# Patient Record
Sex: FEMALE | Race: WHITE | Employment: PART TIME | URBAN - METROPOLITAN AREA
[De-identification: names, ages, dates, MRNs, and addresses within clinical notes are randomized per-mention and may not be internally consistent; named-entity substitution may affect disease eponyms.]

---

## 2017-01-04 ENCOUNTER — OFFICE VISIT (OUTPATIENT)
Dept: FAMILY MEDICINE CLINIC | Age: 47
End: 2017-01-04

## 2017-01-04 VITALS
DIASTOLIC BLOOD PRESSURE: 70 MMHG | HEIGHT: 72 IN | WEIGHT: 190 LBS | SYSTOLIC BLOOD PRESSURE: 122 MMHG | HEART RATE: 57 BPM | BODY MASS INDEX: 25.73 KG/M2 | RESPIRATION RATE: 16 BRPM

## 2017-01-04 DIAGNOSIS — E28.2 PCOS (POLYCYSTIC OVARIAN SYNDROME): Primary | ICD-10-CM

## 2017-01-04 PROCEDURE — 99214 OFFICE O/P EST MOD 30 MIN: CPT | Performed by: FAMILY MEDICINE

## 2017-06-14 ENCOUNTER — TELEPHONE (OUTPATIENT)
Dept: FAMILY MEDICINE CLINIC | Age: 47
End: 2017-06-14

## 2017-06-14 DIAGNOSIS — Z13.1 SCREENING FOR DIABETES MELLITUS: ICD-10-CM

## 2017-06-14 DIAGNOSIS — Z13.220 SCREENING, LIPID: Primary | ICD-10-CM

## 2017-06-28 LAB
CHOLESTEROL, TOTAL: 148 MG/DL (ref 0–199)
GLUCOSE BLD-MCNC: 96 MG/DL (ref 70–99)
HDLC SERPL-MCNC: 55 MG/DL (ref 40–60)
LDL CHOLESTEROL CALCULATED: 75 MG/DL
TRIGL SERPL-MCNC: 90 MG/DL (ref 0–150)

## 2017-07-05 ENCOUNTER — OFFICE VISIT (OUTPATIENT)
Dept: FAMILY MEDICINE CLINIC | Age: 47
End: 2017-07-05

## 2017-07-05 VITALS
SYSTOLIC BLOOD PRESSURE: 112 MMHG | RESPIRATION RATE: 20 BRPM | HEIGHT: 71 IN | TEMPERATURE: 98.3 F | BODY MASS INDEX: 27.16 KG/M2 | WEIGHT: 194 LBS | HEART RATE: 62 BPM | DIASTOLIC BLOOD PRESSURE: 72 MMHG

## 2017-07-05 DIAGNOSIS — E28.2 PCOS (POLYCYSTIC OVARIAN SYNDROME): Primary | ICD-10-CM

## 2017-07-05 PROCEDURE — 99213 OFFICE O/P EST LOW 20 MIN: CPT | Performed by: FAMILY MEDICINE

## 2017-07-05 ASSESSMENT — PATIENT HEALTH QUESTIONNAIRE - PHQ9
SUM OF ALL RESPONSES TO PHQ9 QUESTIONS 1 & 2: 0
2. FEELING DOWN, DEPRESSED OR HOPELESS: 0
1. LITTLE INTEREST OR PLEASURE IN DOING THINGS: 0
SUM OF ALL RESPONSES TO PHQ QUESTIONS 1-9: 0

## 2017-07-20 ENCOUNTER — HOSPITAL ENCOUNTER (OUTPATIENT)
Dept: WOMENS IMAGING | Age: 47
Discharge: HOME | End: 2017-07-20
Attending: FAMILY MEDICINE | Admitting: FAMILY MEDICINE

## 2017-07-20 DIAGNOSIS — Z12.31 ENCOUNTER FOR SCREENING MAMMOGRAM FOR HIGH-RISK PATIENT: ICD-10-CM

## 2017-09-06 ENCOUNTER — OFFICE VISIT (OUTPATIENT)
Dept: DERMATOLOGY | Age: 47
End: 2017-09-06

## 2017-09-06 DIAGNOSIS — Z86.018 HISTORY OF DYSPLASTIC NEVUS: ICD-10-CM

## 2017-09-06 DIAGNOSIS — L82.1 SEBORRHEIC KERATOSES: ICD-10-CM

## 2017-09-06 DIAGNOSIS — D22.9 BENIGN NEVUS: Primary | ICD-10-CM

## 2017-09-06 DIAGNOSIS — L82.0 SEBORRHEIC KERATOSES, INFLAMED: ICD-10-CM

## 2017-09-06 PROCEDURE — 99214 OFFICE O/P EST MOD 30 MIN: CPT | Performed by: DERMATOLOGY

## 2017-09-06 PROCEDURE — 17110 DESTRUCTION B9 LES UP TO 14: CPT | Performed by: DERMATOLOGY

## 2017-09-20 ENCOUNTER — OFFICE VISIT (OUTPATIENT)
Dept: FAMILY MEDICINE CLINIC | Age: 47
End: 2017-09-20

## 2017-09-20 VITALS
WEIGHT: 196 LBS | DIASTOLIC BLOOD PRESSURE: 64 MMHG | HEART RATE: 62 BPM | TEMPERATURE: 98.6 F | HEIGHT: 71 IN | SYSTOLIC BLOOD PRESSURE: 118 MMHG | RESPIRATION RATE: 20 BRPM | BODY MASS INDEX: 27.44 KG/M2

## 2017-09-20 DIAGNOSIS — R49.0 HOARSENESS: ICD-10-CM

## 2017-09-20 DIAGNOSIS — J06.9 VIRAL URI: Primary | ICD-10-CM

## 2017-09-20 PROCEDURE — 99213 OFFICE O/P EST LOW 20 MIN: CPT | Performed by: FAMILY MEDICINE

## 2017-11-27 ENCOUNTER — TELEPHONE (OUTPATIENT)
Dept: DERMATOLOGY | Age: 47
End: 2017-11-27

## 2017-11-27 NOTE — TELEPHONE ENCOUNTER
Pt calling states she has a mole on her stomach pls call patient back to discuss @ [phone] thank you

## 2017-12-04 ENCOUNTER — OFFICE VISIT (OUTPATIENT)
Dept: DERMATOLOGY | Age: 47
End: 2017-12-04

## 2017-12-04 DIAGNOSIS — Z86.018 HISTORY OF DYSPLASTIC NEVUS: ICD-10-CM

## 2017-12-04 DIAGNOSIS — D22.9 BENIGN NEVUS: ICD-10-CM

## 2017-12-04 DIAGNOSIS — D48.5 NEOPLASM OF UNCERTAIN BEHAVIOR OF SKIN: Primary | ICD-10-CM

## 2017-12-04 PROCEDURE — 99212 OFFICE O/P EST SF 10 MIN: CPT | Performed by: DERMATOLOGY

## 2017-12-04 PROCEDURE — 11100 PR BIOPSY OF SKIN LESION: CPT | Performed by: DERMATOLOGY

## 2017-12-04 NOTE — PROGRESS NOTES
[facility]) Dermatology  Chito Santana M.D.  [phone]       Jackie Loyola  1970    41 y.o. female     Date of Visit: 12/4/2017    Chief Complaint:   Chief Complaint   Patient presents with    Skin Lesion     abdomen and chest        I was asked to see this patient by Dr. Lopez ref. provider found. History of Present Illness:  1. Patient notes brown nevus left abdomen has increased in size relatively abruptly-over the last 1-2 months. Asymptomatic. Not itching, bleeding. Has a slightly moderate diffuse nevus right superior breast.  Seems to be stable in size-not itching, bleeding. .      Skin history:  + hats/ SPF. No prior h/o tanning bed     No PH skin cancer  1/16 right medial breast dysplastic nevus with mild dysplasia     + mother / father with NMSC    Review of Systems:  Constitutional: Reports general sense of well-being       Past Medical History, Surgical History, Family History, Medications and Allergies reviewed. 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 Social History Main Topics    Smoking status: Never Smoker    Smokeless tobacco: Never Used    Alcohol use No    Drug use: No    Sexual activity: Yes     Partners: Male      Comment: Gracy     Other Topics Concern    Not on file     Social History Narrative    No narrative on file       Physical Examination       -General: Well-appearing, NAD  1. Left abdomen 6 mm well-demarcated dark brown papule-rule out atypia. Right breast slightly more diffuse papule      Assessment and Plan     1. Neoplasm of uncertain behavior of skin -Left abdomen--Discussed possible diagnosis. Patient agreeable to biopsy. Verbal consent obtained after risks (infection, bleeding, scar), benefits and alternatives explained.    -Area(s) to be biopsied were marked with a surgical pen. Site(s) were cleansed with alcohol. Local anesthesia achieved with 1% lidocaine with epinephrine/sodium bicarbonate. Shave biopsy performed with a razor blade. Hemostasis was achieved with aluminum chloride. The wound(s) were dressed with petrolatum and covered with a bandage. Specimen(s) sent to pathology. Pt educated re: risk of bleeding, infection, scar and wound care instructions. 2. Benign nevus - Benign acquired melanocytic nevi  -Recommend monthly self skin exams   -Educated regarding the ABCDEs of melanoma detection   -Call for any new/changing moles or concerning lesions  -Reviewed sun protective behavior -- sun avoidance during the peak hours of the day, sun-protective clothing (including hat and sunglasses), sunscreen use (water resistant, broad spectrum, SPF at least 30, need for reapplication every 2 to 3 hours), avoidance of tanning beds      3.  History of dysplastic nevus -Patient aware of increased risk of malignancy, monitor her nevi for change

## 2017-12-11 ENCOUNTER — TELEPHONE (OUTPATIENT)
Dept: DERMATOLOGY | Age: 47
End: 2017-12-11

## 2017-12-11 NOTE — TELEPHONE ENCOUNTER
Called and LM on  with pathology results. (OK per HIPAA)      Date of biopsy: 12/04/2017  Site of biopsy: L abdomen  Result: Compound nevus    Plan: No further treatment needed.

## 2018-03-25 ENCOUNTER — NURSE TRIAGE (OUTPATIENT)
Dept: OTHER | Facility: CLINIC | Age: 48
End: 2018-03-25

## 2018-03-25 ENCOUNTER — TELEPHONE (OUTPATIENT)
Dept: FAMILY MEDICINE CLINIC | Age: 48
End: 2018-03-25

## 2018-03-25 NOTE — TELEPHONE ENCOUNTER
Reason for Disposition   Caller has already spoken with the PCP and has no further questions. Protocols used: NO CONTACT OR DUPLICATE CONTACT CALL-ADULT-    PCP called patient back and feels home care is fine at this time and that headache is probably due to new onset illness. Patient will wait, call PCP in AM for appt, but will call back if pain persists or worsens, or if unable to get in to see PCP tomorrow.

## 2018-03-27 ENCOUNTER — OFFICE VISIT (OUTPATIENT)
Dept: FAMILY MEDICINE CLINIC | Age: 48
End: 2018-03-27

## 2018-03-27 VITALS
DIASTOLIC BLOOD PRESSURE: 82 MMHG | HEART RATE: 63 BPM | OXYGEN SATURATION: 99 % | SYSTOLIC BLOOD PRESSURE: 110 MMHG | RESPIRATION RATE: 16 BRPM | HEIGHT: 71 IN | TEMPERATURE: 97.9 F | BODY MASS INDEX: 27.3 KG/M2 | WEIGHT: 195 LBS

## 2018-03-27 DIAGNOSIS — R51.9 CHRONIC INTRACTABLE HEADACHE, UNSPECIFIED HEADACHE TYPE: Primary | ICD-10-CM

## 2018-03-27 DIAGNOSIS — R11.0 NAUSEA: ICD-10-CM

## 2018-03-27 DIAGNOSIS — G89.29 CHRONIC INTRACTABLE HEADACHE, UNSPECIFIED HEADACHE TYPE: Primary | ICD-10-CM

## 2018-03-27 PROCEDURE — 99214 OFFICE O/P EST MOD 30 MIN: CPT | Performed by: FAMILY MEDICINE

## 2018-03-27 ASSESSMENT — ENCOUNTER SYMPTOMS
NAUSEA: 1
RESPIRATORY NEGATIVE: 1
EYES NEGATIVE: 1

## 2018-06-13 ENCOUNTER — OFFICE VISIT (OUTPATIENT)
Dept: FAMILY MEDICINE CLINIC | Age: 48
End: 2018-06-13

## 2018-06-13 VITALS
WEIGHT: 181 LBS | BODY MASS INDEX: 25.34 KG/M2 | SYSTOLIC BLOOD PRESSURE: 112 MMHG | HEIGHT: 71 IN | RESPIRATION RATE: 12 BRPM | DIASTOLIC BLOOD PRESSURE: 64 MMHG | HEART RATE: 71 BPM | OXYGEN SATURATION: 96 % | TEMPERATURE: 98.2 F

## 2018-06-13 DIAGNOSIS — Z13.1 SCREENING FOR DIABETES MELLITUS: ICD-10-CM

## 2018-06-13 DIAGNOSIS — M54.2 CERVICAL PAIN: ICD-10-CM

## 2018-06-13 DIAGNOSIS — F43.22 ANXIOUS MOOD AS ADJUSTMENT REACTION: ICD-10-CM

## 2018-06-13 DIAGNOSIS — Z13.220 SCREENING, LIPID: ICD-10-CM

## 2018-06-13 DIAGNOSIS — M54.41 ACUTE RIGHT-SIDED LOW BACK PAIN WITH RIGHT-SIDED SCIATICA: ICD-10-CM

## 2018-06-13 DIAGNOSIS — Z00.00 WELL ADULT EXAM: Primary | ICD-10-CM

## 2018-06-13 DIAGNOSIS — R20.2 PARESTHESIA OF RIGHT FOOT: ICD-10-CM

## 2018-06-13 LAB
A/G RATIO: 1.7 (ref 1.1–2.2)
ALBUMIN SERPL-MCNC: 4.5 G/DL (ref 3.4–5)
ALP BLD-CCNC: 44 U/L (ref 40–129)
ALT SERPL-CCNC: 11 U/L (ref 10–40)
ANION GAP SERPL CALCULATED.3IONS-SCNC: 15 MMOL/L (ref 3–16)
AST SERPL-CCNC: 15 U/L (ref 15–37)
BILIRUB SERPL-MCNC: 0.6 MG/DL (ref 0–1)
BUN BLDV-MCNC: 5 MG/DL (ref 7–20)
CALCIUM SERPL-MCNC: 10.2 MG/DL (ref 8.3–10.6)
CHLORIDE BLD-SCNC: 102 MMOL/L (ref 99–110)
CHOLESTEROL, TOTAL: 165 MG/DL (ref 0–199)
CO2: 24 MMOL/L (ref 21–32)
CREAT SERPL-MCNC: 0.7 MG/DL (ref 0.6–1.1)
GFR AFRICAN AMERICAN: >60
GFR NON-AFRICAN AMERICAN: >60
GLOBULIN: 2.6 G/DL
GLUCOSE BLD-MCNC: 99 MG/DL (ref 70–99)
HDLC SERPL-MCNC: 64 MG/DL (ref 40–60)
LDL CHOLESTEROL CALCULATED: 86 MG/DL
POTASSIUM SERPL-SCNC: 4.1 MMOL/L (ref 3.5–5.1)
SODIUM BLD-SCNC: 141 MMOL/L (ref 136–145)
TOTAL PROTEIN: 7.1 G/DL (ref 6.4–8.2)
TRIGL SERPL-MCNC: 75 MG/DL (ref 0–150)
VLDLC SERPL CALC-MCNC: 15 MG/DL

## 2018-06-13 PROCEDURE — 99396 PREV VISIT EST AGE 40-64: CPT | Performed by: FAMILY MEDICINE

## 2018-06-15 ENCOUNTER — TELEPHONE (OUTPATIENT)
Dept: FAMILY MEDICINE CLINIC | Age: 48
End: 2018-06-15

## 2018-06-19 ENCOUNTER — TELEPHONE (OUTPATIENT)
Dept: FAMILY MEDICINE CLINIC | Age: 48
End: 2018-06-19

## 2018-06-19 RX ORDER — HYDROXYZINE HYDROCHLORIDE 25 MG/1
25 TABLET, FILM COATED ORAL 3 TIMES DAILY PRN
Qty: 90 TABLET | Refills: 0 | Status: SHIPPED | OUTPATIENT
Start: 2018-06-19 | End: 2018-07-19

## 2018-07-06 ENCOUNTER — OFFICE VISIT (OUTPATIENT)
Dept: FAMILY MEDICINE CLINIC | Age: 48
End: 2018-07-06

## 2018-07-06 VITALS
TEMPERATURE: 98.4 F | BODY MASS INDEX: 24.08 KG/M2 | RESPIRATION RATE: 12 BRPM | WEIGHT: 172 LBS | SYSTOLIC BLOOD PRESSURE: 110 MMHG | HEIGHT: 71 IN | HEART RATE: 64 BPM | DIASTOLIC BLOOD PRESSURE: 82 MMHG

## 2018-07-06 DIAGNOSIS — F41.9 ANXIETY DISORDER, UNSPECIFIED TYPE: Primary | ICD-10-CM

## 2018-07-06 PROCEDURE — 99213 OFFICE O/P EST LOW 20 MIN: CPT | Performed by: FAMILY MEDICINE

## 2018-07-06 ASSESSMENT — PATIENT HEALTH QUESTIONNAIRE - PHQ9
SUM OF ALL RESPONSES TO PHQ9 QUESTIONS 1 & 2: 1
SUM OF ALL RESPONSES TO PHQ QUESTIONS 1-9: 1
1. LITTLE INTEREST OR PLEASURE IN DOING THINGS: 1
2. FEELING DOWN, DEPRESSED OR HOPELESS: 0

## 2018-07-09 ENCOUNTER — TELEPHONE (OUTPATIENT)
Dept: FAMILY MEDICINE CLINIC | Age: 48
End: 2018-07-09

## 2018-07-10 ENCOUNTER — TELEPHONE (OUTPATIENT)
Dept: FAMILY MEDICINE CLINIC | Age: 48
End: 2018-07-10

## 2018-07-10 DIAGNOSIS — G89.29 CHRONIC RIGHT-SIDED LOW BACK PAIN WITH RIGHT-SIDED SCIATICA: Primary | ICD-10-CM

## 2018-07-10 DIAGNOSIS — M54.41 CHRONIC RIGHT-SIDED LOW BACK PAIN WITH RIGHT-SIDED SCIATICA: Primary | ICD-10-CM

## 2018-07-10 NOTE — TELEPHONE ENCOUNTER
Pt called and wanted to know if dr Naif Nunes can order a mri of the lower lumbar and pelvic area. She is having more back pain and its going down right leg and getting numb.   Her therapist also suggested getting this done        Please advise

## 2018-07-16 ENCOUNTER — TELEPHONE (OUTPATIENT)
Dept: FAMILY MEDICINE CLINIC | Age: 48
End: 2018-07-16

## 2018-08-17 ENCOUNTER — OFFICE VISIT (OUTPATIENT)
Dept: FAMILY MEDICINE CLINIC | Age: 48
End: 2018-08-17

## 2018-08-17 VITALS
TEMPERATURE: 98.6 F | BODY MASS INDEX: 22.68 KG/M2 | OXYGEN SATURATION: 98 % | DIASTOLIC BLOOD PRESSURE: 82 MMHG | HEIGHT: 71 IN | RESPIRATION RATE: 12 BRPM | HEART RATE: 63 BPM | WEIGHT: 162 LBS | SYSTOLIC BLOOD PRESSURE: 110 MMHG

## 2018-08-17 DIAGNOSIS — F43.22 ANXIOUS MOOD AS ADJUSTMENT REACTION: Primary | ICD-10-CM

## 2018-08-17 DIAGNOSIS — H69.82 DYSFUNCTION OF LEFT EUSTACHIAN TUBE: ICD-10-CM

## 2018-08-17 PROCEDURE — 99214 OFFICE O/P EST MOD 30 MIN: CPT | Performed by: FAMILY MEDICINE

## 2018-08-17 NOTE — PROGRESS NOTES
Subjective:      Patient ID: Ernie Gallardo is a 57 y.o. female. Blood pressure 110/82, pulse 63, temperature 98.6 °F (37 °C), temperature source Oral, resp. rate 12, height 5' 11\" (1.803 m), weight 162 lb (73.5 kg), SpO2 98 %, not currently breastfeeding. HPI Here for routine follow up of anxiety. Is a mom of 4. Started zoloft 6 weeks ago- 25mg for 1 week, then 50mg since. In the past week she has started to note some positive effects:  Sleeping better  Less stressed and no longer has panic symptoms or the threat of panic. Muscle tension may be less. (but also seeing physical therapy for neck tightness)    Side effects to zoloft: At first was sweaty, ear congestion. Now resolved. Sees psychologistJonatan weekly, but she is going out of town for a while and will not be back until October. Is OK with not seeing her for a while. Left ear frequently feels 'clogged' and abnormal hearing. Has tried ear drops of some sort- does seem to help. Has been swimming a lot lately. Has not had fall allergies before. No other symptoms. Patient Active Problem List   Diagnosis    Labor and delivery indication for care or intervention    PCOS (polycystic ovarian syndrome)- based on oligomenorrhea, high androstenedione.  Anxious mood as adjustment reaction      Body mass index is 22.59 kg/m². Wt Readings from Last 3 Encounters:   08/17/18 162 lb (73.5 kg)   07/06/18 172 lb (78 kg)   06/13/18 181 lb (82.1 kg)      BP Readings from Last 3 Encounters:   08/17/18 110/82   07/06/18 110/82   06/13/18 112/64      Current Outpatient Prescriptions   Medication Sig Dispense Refill    sertraline (ZOLOFT) 50 MG tablet Take 1 tablet by mouth daily 30 tablet 3    prenatal vitamin (PRENATAL-S) 27-0.8 MG TABS Take 1 tablet by mouth daily.  fish oil-omega-3 fatty acids 1000 MG capsule Take 2 g by mouth daily. No current facility-administered medications for this visit.        Immunization

## 2018-09-20 ENCOUNTER — OFFICE VISIT (OUTPATIENT)
Dept: FAMILY MEDICINE CLINIC | Age: 48
End: 2018-09-20

## 2018-09-20 VITALS
DIASTOLIC BLOOD PRESSURE: 74 MMHG | HEART RATE: 62 BPM | SYSTOLIC BLOOD PRESSURE: 116 MMHG | WEIGHT: 167.6 LBS | HEIGHT: 71 IN | OXYGEN SATURATION: 98 % | BODY MASS INDEX: 23.46 KG/M2 | TEMPERATURE: 98.2 F | RESPIRATION RATE: 16 BRPM

## 2018-09-20 DIAGNOSIS — R20.2 PARESTHESIA OF UPPER LIMB: ICD-10-CM

## 2018-09-20 DIAGNOSIS — M54.16 LUMBAR RADICULOPATHY: ICD-10-CM

## 2018-09-20 DIAGNOSIS — M54.12 CERVICAL RADICULOPATHY: Primary | ICD-10-CM

## 2018-09-20 DIAGNOSIS — R20.2 LEG PARESTHESIA: ICD-10-CM

## 2018-09-20 DIAGNOSIS — M41.114 JUVENILE IDIOPATHIC SCOLIOSIS OF THORACIC REGION: ICD-10-CM

## 2018-09-20 PROCEDURE — 99213 OFFICE O/P EST LOW 20 MIN: CPT | Performed by: FAMILY MEDICINE

## 2018-09-20 NOTE — PROGRESS NOTES
Value Ref Range Status   06/13/2018 64 (H) 40 - 60 mg/dL Final   07/12/2011 54 40 - 60 mg/dl Final     Triglycerides   Date Value Ref Range Status   06/13/2018 75 0 - 150 mg/dL Final     Lab Results   Component Value Date    GLUCOSE 99 06/13/2018     Lab Results   Component Value Date     06/13/2018    K 4.1 06/13/2018    CREATININE 0.7 06/13/2018     Lab Results   Component Value Date    WBC 6.3 01/27/2016    HGB 13.0 01/27/2016    HCT 39.6 01/27/2016    MCV 88.0 01/27/2016     01/27/2016     Lab Results   Component Value Date    ALT 11 06/13/2018    AST 15 06/13/2018    ALKPHOS 44 06/13/2018    BILITOT 0.6 06/13/2018     TSH (uIU/ml)   Date Value   07/09/2012 1.46     No results found for: LABA1C  PROBLEM VISIT NOTE   Subjective:     Chief Complaint   Patient presents with    Other     pt c/o numbness and tingling in her arms and legs that has been going on off and on all Summer. Pt would like to discuss having an MRI of her back. Frutoso Braman is a 57 y.o. female who presents with  Duration tingle ache L leg last few weeks, and  left arm and R forearm last few days  In PT starting in July for low back pain and R foot tingling- back better but leg not  Had PT before that for neck and R arm tingling which did clear up in June  Denies incontinence  Hx scoliosis    Patient's medications, allergies, past medical, surgical, social and family histories were reviewed and updated as appropriate (see below). CHART REVIEW  Health Maintenance   Topic Date Due    HIV screen  08/02/1987    Cervical cancer screen  10/02/2015    Flu vaccine (1) 09/01/2018    Lipid screen  06/13/2023    DTaP/Tdap/Td vaccine (2 - Td) 09/22/2024      Patient Active Problem List   Diagnosis    Labor and delivery indication for care or intervention    PCOS (polycystic ovarian syndrome)- based on oligomenorrhea, high androstenedione.     Anxious mood as adjustment reaction    Juvenile idiopathic scoliosis of thoracic region     Cholesterol, Total   Date Value Ref Range Status   06/13/2018 165 0 - 199 mg/dL Final     LDL Calculated   Date Value Ref Range Status   06/13/2018 86 <100 mg/dL Final     HDL   Date Value Ref Range Status   06/13/2018 64 (H) 40 - 60 mg/dL Final   07/12/2011 54 40 - 60 mg/dl Final     Triglycerides   Date Value Ref Range Status   06/13/2018 75 0 - 150 mg/dL Final     Lab Results   Component Value Date    GLUCOSE 99 06/13/2018     Lab Results   Component Value Date     06/13/2018    K 4.1 06/13/2018    CREATININE 0.7 06/13/2018     Lab Results   Component Value Date    WBC 6.3 01/27/2016    HGB 13.0 01/27/2016    HCT 39.6 01/27/2016    MCV 88.0 01/27/2016     01/27/2016     Lab Results   Component Value Date    ALT 11 06/13/2018    AST 15 06/13/2018    ALKPHOS 44 06/13/2018    BILITOT 0.6 06/13/2018     TSH (uIU/ml)   Date Value   07/09/2012 1.46     No results found for: LABA1C  Current Outpatient Prescriptions   Medication Sig Dispense Refill    sertraline (ZOLOFT) 50 MG tablet Take 1 tablet by mouth daily 30 tablet 3    prenatal vitamin (PRENATAL-S) 27-0.8 MG TABS Take 1 tablet by mouth daily.  fish oil-omega-3 fatty acids 1000 MG capsule Take 2 g by mouth daily. No current facility-administered medications for this visit. Objective:    PHYSICAL EXAM   /74 (Site: Left Upper Arm, Position: Sitting, Cuff Size: Medium Adult)   Pulse 62   Temp 98.2 °F (36.8 °C) (Oral)   Resp 16   Ht 5' 11\" (1.803 m)   Wt 167 lb 9.6 oz (76 kg)   SpO2 98%   BMI 23.38 kg/m²   Blood pressure is Good. BP Readings from Last 5 Encounters:   09/20/18 116/74   08/17/18 110/82   07/06/18 110/82   06/13/18 112/64   03/27/18 110/82     Weight is increased.    Wt Readings from Last 5 Encounters:   09/20/18 167 lb 9.6 oz (76 kg)   08/17/18 162 lb (73.5 kg)   07/06/18 172 lb (78 kg)   06/13/18 181 lb (82.1 kg)   03/27/18 195 lb (88.5 kg)      GENERAL:   · well-developed, well-nourished,

## 2018-09-24 ENCOUNTER — TELEPHONE (OUTPATIENT)
Dept: FAMILY MEDICINE CLINIC | Age: 48
End: 2018-09-24

## 2018-09-25 ENCOUNTER — TELEPHONE (OUTPATIENT)
Dept: FAMILY MEDICINE CLINIC | Age: 48
End: 2018-09-25

## 2018-09-25 DIAGNOSIS — R20.2 PARESTHESIA OF UPPER AND LOWER EXTREMITIES OF BOTH SIDES: Primary | ICD-10-CM

## 2018-09-25 NOTE — TELEPHONE ENCOUNTER
Pt has an MRI schedule for this Friday and would like to speak with Dr. Bautista Lock again regarding this. Pt states she spoke with him today already.        Call back # [phone]

## 2018-09-26 ENCOUNTER — TELEPHONE (OUTPATIENT)
Dept: FAMILY MEDICINE CLINIC | Age: 48
End: 2018-09-26

## 2018-09-26 NOTE — TELEPHONE ENCOUNTER
Pt called back today stating perhaps she should speak with Dr. Jay Oglesby in regards to the MRI bc she ordered it. Pt states she feels the MRI may be better with contrast since it was ordered to rule out MS.        Pt's call back number: [phone]

## 2018-09-26 NOTE — TELEPHONE ENCOUNTER
Talked to [facility]. I am not sure that her constellation of symptoms necessarily warrant $7000 of mri testing just yet. She is mainly concerned with UE paresthesias. I suggested seeing a neurologist first and getting their opinion prior to having further testing. EMG may be a better test at this point. But have not evaluated her problem directly. Staff: please give Daja the referral info for dr Stefani Mayers. Thanks.

## 2018-09-28 ENCOUNTER — HOSPITAL ENCOUNTER (OUTPATIENT)
Dept: MRI IMAGING | Age: 48
Discharge: HOME | End: 2018-09-28
Payer: COMMERCIAL

## 2018-09-28 DIAGNOSIS — M54.12 CERVICAL RADICULOPATHY: ICD-10-CM

## 2018-09-28 DIAGNOSIS — R20.2 LEG PARESTHESIA: ICD-10-CM

## 2018-09-28 DIAGNOSIS — R20.2 PARESTHESIA OF UPPER LIMB: ICD-10-CM

## 2018-09-28 DIAGNOSIS — M54.16 LUMBAR RADICULOPATHY: ICD-10-CM

## 2018-09-28 PROCEDURE — 70551 MRI BRAIN STEM W/O DYE: CPT

## 2018-09-28 PROCEDURE — 72141 MRI NECK SPINE W/O DYE: CPT

## 2018-10-01 ENCOUNTER — OFFICE VISIT (OUTPATIENT)
Dept: FAMILY MEDICINE CLINIC | Age: 48
End: 2018-10-01
Payer: COMMERCIAL

## 2018-10-01 VITALS
HEART RATE: 71 BPM | TEMPERATURE: 97.6 F | RESPIRATION RATE: 14 BRPM | DIASTOLIC BLOOD PRESSURE: 76 MMHG | HEIGHT: 71 IN | WEIGHT: 164 LBS | SYSTOLIC BLOOD PRESSURE: 126 MMHG | BODY MASS INDEX: 22.96 KG/M2

## 2018-10-01 DIAGNOSIS — M47.812 FACET ARTHRITIS, DEGENERATIVE, CERVICAL SPINE: ICD-10-CM

## 2018-10-01 DIAGNOSIS — M79.602 PAIN IN BOTH UPPER EXTREMITIES: Primary | ICD-10-CM

## 2018-10-01 DIAGNOSIS — M79.601 PAIN IN BOTH UPPER EXTREMITIES: Primary | ICD-10-CM

## 2018-10-01 DIAGNOSIS — F43.22 ANXIOUS MOOD AS ADJUSTMENT REACTION: ICD-10-CM

## 2018-10-01 PROCEDURE — 99213 OFFICE O/P EST LOW 20 MIN: CPT | Performed by: FAMILY MEDICINE

## 2018-11-08 ENCOUNTER — OFFICE VISIT (OUTPATIENT)
Dept: FAMILY MEDICINE CLINIC | Age: 48
End: 2018-11-08
Payer: COMMERCIAL

## 2018-11-08 VITALS
WEIGHT: 169.2 LBS | HEIGHT: 71 IN | DIASTOLIC BLOOD PRESSURE: 70 MMHG | BODY MASS INDEX: 23.69 KG/M2 | SYSTOLIC BLOOD PRESSURE: 110 MMHG

## 2018-11-08 DIAGNOSIS — M47.812 FACET ARTHRITIS, DEGENERATIVE, CERVICAL SPINE: ICD-10-CM

## 2018-11-08 DIAGNOSIS — Z00.00 HEALTHCARE MAINTENANCE: Primary | ICD-10-CM

## 2018-11-08 PROCEDURE — 99203 OFFICE O/P NEW LOW 30 MIN: CPT | Performed by: FAMILY MEDICINE

## 2018-11-08 NOTE — PROGRESS NOTES
respiratory distress. Abdominal: Soft. Bowel sounds are normal. There is no tenderness. Musculoskeletal: She exhibits no edema. Lymphadenopathy:     She has no cervical adenopathy. Neurological: She is alert and oriented to person, place, and time. Skin: Skin is warm. No rash noted. Psychiatric: She has a normal mood and affect. Thought content normal.       Assessment:      Assessment/Plan:  Daja was seen today for established new doctor.     Diagnoses and all orders for this visit:    Healthcare maintenance    Facet arthritis, degenerative, cervical spine            Plan:      Health care discussion   recommend lab [facility]          [address],

## 2018-11-12 PROBLEM — F43.22 ANXIOUS MOOD AS ADJUSTMENT REACTION: Status: RESOLVED | Noted: 2018-06-13 | Resolved: 2018-11-12

## 2018-11-12 ASSESSMENT — ENCOUNTER SYMPTOMS
COUGH: 0
ABDOMINAL PAIN: 0
WHEEZING: 0
EYE PAIN: 0
SHORTNESS OF BREATH: 0

## 2018-11-12 ASSESSMENT — PATIENT HEALTH QUESTIONNAIRE - PHQ9
SUM OF ALL RESPONSES TO PHQ QUESTIONS 1-9: 0
SUM OF ALL RESPONSES TO PHQ9 QUESTIONS 1 & 2: 0
2. FEELING DOWN, DEPRESSED OR HOPELESS: 0
1. LITTLE INTEREST OR PLEASURE IN DOING THINGS: 0
SUM OF ALL RESPONSES TO PHQ QUESTIONS 1-9: 0

## 2018-12-11 ENCOUNTER — OFFICE VISIT (OUTPATIENT)
Dept: FAMILY MEDICINE CLINIC | Age: 48
End: 2018-12-11
Payer: COMMERCIAL

## 2018-12-11 VITALS
DIASTOLIC BLOOD PRESSURE: 80 MMHG | WEIGHT: 163.2 LBS | BODY MASS INDEX: 22.85 KG/M2 | SYSTOLIC BLOOD PRESSURE: 120 MMHG | HEIGHT: 71 IN

## 2018-12-11 DIAGNOSIS — M79.2 NEUROPATHIC PAIN: Primary | ICD-10-CM

## 2018-12-11 PROCEDURE — 99214 OFFICE O/P EST MOD 30 MIN: CPT | Performed by: FAMILY MEDICINE

## 2018-12-11 RX ORDER — GABAPENTIN 100 MG/1
100 CAPSULE ORAL 3 TIMES DAILY
Qty: 90 CAPSULE | Refills: 1 | Status: SHIPPED | OUTPATIENT
Start: 2018-12-11 | End: 2018-12-28 | Stop reason: DRUGHIGH

## 2018-12-12 ASSESSMENT — ENCOUNTER SYMPTOMS
SHORTNESS OF BREATH: 0
COUGH: 0
BACK PAIN: 1
CHEST TIGHTNESS: 0
ABDOMINAL PAIN: 0

## 2018-12-28 ENCOUNTER — OFFICE VISIT (OUTPATIENT)
Dept: FAMILY MEDICINE CLINIC | Age: 48
End: 2018-12-28
Payer: COMMERCIAL

## 2018-12-28 VITALS
DIASTOLIC BLOOD PRESSURE: 60 MMHG | BODY MASS INDEX: 22.57 KG/M2 | HEIGHT: 71 IN | SYSTOLIC BLOOD PRESSURE: 110 MMHG | WEIGHT: 161.2 LBS

## 2018-12-28 DIAGNOSIS — G62.9 NEUROPATHY: ICD-10-CM

## 2018-12-28 DIAGNOSIS — M47.812 FACET ARTHRITIS, DEGENERATIVE, CERVICAL SPINE: Primary | ICD-10-CM

## 2018-12-28 PROCEDURE — 99213 OFFICE O/P EST LOW 20 MIN: CPT | Performed by: FAMILY MEDICINE

## 2018-12-28 RX ORDER — GABAPENTIN 300 MG/1
300 CAPSULE ORAL 2 TIMES DAILY
Qty: 60 CAPSULE | Refills: 0 | Status: SHIPPED | OUTPATIENT
Start: 2018-12-28 | End: 2019-02-20

## 2018-12-29 ASSESSMENT — ENCOUNTER SYMPTOMS
WHEEZING: 0
EYE PAIN: 0
SHORTNESS OF BREATH: 0
COUGH: 0
ABDOMINAL PAIN: 0

## 2019-02-20 ENCOUNTER — OFFICE VISIT (OUTPATIENT)
Dept: DERMATOLOGY | Age: 49
End: 2019-02-20
Payer: COMMERCIAL

## 2019-02-20 DIAGNOSIS — B07.8 OTHER VIRAL WARTS: ICD-10-CM

## 2019-02-20 DIAGNOSIS — Z86.018 HISTORY OF DYSPLASTIC NEVUS: ICD-10-CM

## 2019-02-20 DIAGNOSIS — D22.9 BENIGN NEVUS: ICD-10-CM

## 2019-02-20 DIAGNOSIS — L82.1 SEBORRHEIC KERATOSES: ICD-10-CM

## 2019-02-20 DIAGNOSIS — D48.5 NEOPLASM OF UNCERTAIN BEHAVIOR OF SKIN: Primary | ICD-10-CM

## 2019-02-20 PROCEDURE — 17110 DESTRUCTION B9 LES UP TO 14: CPT | Performed by: DERMATOLOGY

## 2019-02-20 PROCEDURE — 99214 OFFICE O/P EST MOD 30 MIN: CPT | Performed by: DERMATOLOGY

## 2019-02-20 PROCEDURE — 11102 TANGNTL BX SKIN SINGLE LES: CPT | Performed by: DERMATOLOGY

## 2019-02-22 LAB — DERMATOLOGY PATHOLOGY REPORT: NORMAL

## 2019-03-28 ENCOUNTER — OFFICE VISIT (OUTPATIENT)
Dept: FAMILY MEDICINE CLINIC | Age: 49
End: 2019-03-28
Payer: COMMERCIAL

## 2019-03-28 VITALS
HEIGHT: 71 IN | WEIGHT: 154.13 LBS | HEART RATE: 82 BPM | SYSTOLIC BLOOD PRESSURE: 110 MMHG | OXYGEN SATURATION: 99 % | DIASTOLIC BLOOD PRESSURE: 70 MMHG | BODY MASS INDEX: 21.58 KG/M2

## 2019-03-28 DIAGNOSIS — R25.3 MUSCLE TWITCHING: ICD-10-CM

## 2019-03-28 DIAGNOSIS — Z12.39 SCREENING FOR BREAST CANCER: Primary | ICD-10-CM

## 2019-03-28 DIAGNOSIS — R07.9 CHEST PAIN, UNSPECIFIED TYPE: ICD-10-CM

## 2019-03-28 LAB — RHEUMATOID FACTOR: <10 IU/ML

## 2019-03-28 PROCEDURE — 93000 ELECTROCARDIOGRAM COMPLETE: CPT | Performed by: INTERNAL MEDICINE

## 2019-03-28 PROCEDURE — 99204 OFFICE O/P NEW MOD 45 MIN: CPT | Performed by: INTERNAL MEDICINE

## 2019-03-28 RX ORDER — MAGNESIUM OXIDE 400 MG/1
400 TABLET ORAL DAILY
COMMUNITY
End: 2019-04-16

## 2019-03-28 ASSESSMENT — PATIENT HEALTH QUESTIONNAIRE - PHQ9
2. FEELING DOWN, DEPRESSED OR HOPELESS: 0
SUM OF ALL RESPONSES TO PHQ QUESTIONS 1-9: 0
1. LITTLE INTEREST OR PLEASURE IN DOING THINGS: 0
SUM OF ALL RESPONSES TO PHQ9 QUESTIONS 1 & 2: 0
SUM OF ALL RESPONSES TO PHQ QUESTIONS 1-9: 0

## 2019-03-29 LAB
ANA INTERPRETATION: ABNORMAL
ANA TITER: ABNORMAL
ANTI-CENTROMERE B IGG: <0.2 AI (ref 0–0.9)
ANTI-CHROMATIN IGG: 0.9 AI (ref 0–0.9)
ANTI-DSDNA IGG: 112 IU/ML (ref 0–9)
ANTI-JO1 IGG: <0.2 AI (ref 0–0.9)
ANTI-NUCLEAR ANTIBODY (ANA): POSITIVE
ANTI-RIBOSOMAL P IGG: <0.2 AI (ref 0–0.9)
ANTI-RNP IGG: 0.2 AI (ref 0–0.9)
ANTI-SCL70 IGG: <0.2 AI (ref 0–0.9)
ANTI-SMITH IGG: <0.2 AI (ref 0–0.9)
ANTI-SMRNP IGG: <0.2 AI (ref 0–0.9)
ANTI-SS-A IGG: <0.2 AI (ref 0–0.9)
ANTI-SS-B IGG: <0.2 AI (ref 0–0.9)

## 2019-04-01 ENCOUNTER — TELEPHONE (OUTPATIENT)
Dept: FAMILY MEDICINE CLINIC | Age: 49
End: 2019-04-01

## 2019-04-01 DIAGNOSIS — R76.8 DS DNA ANTIBODY POSITIVE: Primary | ICD-10-CM

## 2019-04-03 ENCOUNTER — EMPLOYEE WELLNESS (OUTPATIENT)
Dept: OTHER | Age: 49
End: 2019-04-03

## 2019-04-03 LAB
CHOLESTEROL, TOTAL: 143 MG/DL (ref 0–199)
GLUCOSE BLD-MCNC: 97 MG/DL (ref 70–99)
HDLC SERPL-MCNC: 64 MG/DL (ref 40–60)
LDL CHOLESTEROL CALCULATED: 70 MG/DL
TRIGL SERPL-MCNC: 47 MG/DL (ref 0–150)

## 2019-04-08 ENCOUNTER — TELEPHONE (OUTPATIENT)
Dept: FAMILY MEDICINE CLINIC | Age: 49
End: 2019-04-08

## 2019-04-08 VITALS — WEIGHT: 147 LBS | BODY MASS INDEX: 20.5 KG/M2

## 2019-04-08 DIAGNOSIS — R76.8 DS DNA ANTIBODY POSITIVE: Primary | ICD-10-CM

## 2019-04-08 NOTE — TELEPHONE ENCOUNTER
The Pt wanted to let Ammon know that she was interested in seeing Ruben Esposito for her Rheumatology appt. He is with [employer]. PT is wanting to know if the referral could be made and faxed to Dr. Pricilla Abreu. PT would like a callback when completed.      NICKOLAS---[phone]  Phone for Dr Sha Edwards ---[phone]

## 2019-04-08 NOTE — TELEPHONE ENCOUNTER
PT would like a call back regarding her rheumatology and neurologist  appt         Best call back---[phone]

## 2019-04-10 ENCOUNTER — TELEPHONE (OUTPATIENT)
Dept: FAMILY MEDICINE CLINIC | Age: 49
End: 2019-04-10

## 2019-04-10 ENCOUNTER — TELEPHONE (OUTPATIENT)
Dept: INTERNAL MEDICINE CLINIC | Age: 49
End: 2019-04-10

## 2019-04-10 NOTE — TELEPHONE ENCOUNTER
Pt call and states that she had scheduled with Dr Jensen Davis as a New Pt. She is newly diagnosed with lupus, she is having Headache since 8 days, she want to ask Dr headache can cause by lupus? ? She request call back.

## 2019-04-10 NOTE — TELEPHONE ENCOUNTER
Dr Nicholas Rodríguez advises headache can be caused by Lupus. I called pt and advised. Her appointment is not until July with Dr William Khanna. She has appointment with PCP tomorrow. Advised pt to go to er if the headaches become too severe. I told her I would leave message for Dr William Khanna to see when she returns to office of Wednesday.

## 2019-04-11 ENCOUNTER — OFFICE VISIT (OUTPATIENT)
Dept: FAMILY MEDICINE CLINIC | Age: 49
End: 2019-04-11
Payer: COMMERCIAL

## 2019-04-11 ENCOUNTER — TELEPHONE (OUTPATIENT)
Dept: FAMILY MEDICINE CLINIC | Age: 49
End: 2019-04-11

## 2019-04-11 VITALS
OXYGEN SATURATION: 98 % | BODY MASS INDEX: 20.74 KG/M2 | HEIGHT: 71 IN | WEIGHT: 148.13 LBS | HEART RATE: 68 BPM | SYSTOLIC BLOOD PRESSURE: 118 MMHG | DIASTOLIC BLOOD PRESSURE: 76 MMHG

## 2019-04-11 DIAGNOSIS — G44.52 NEW DAILY PERSISTENT HEADACHE: Primary | ICD-10-CM

## 2019-04-11 DIAGNOSIS — R76.8 POSITIVE DOUBLE STRANDED DNA ANTIBODY TEST: ICD-10-CM

## 2019-04-11 DIAGNOSIS — R76.8 POSITIVE DOUBLE STRANDED DNA ANTIBODY TEST: Primary | ICD-10-CM

## 2019-04-11 LAB
A/G RATIO: 1.8 (ref 1.1–2.2)
ALBUMIN SERPL-MCNC: 4.4 G/DL (ref 3.4–5)
ALP BLD-CCNC: 44 U/L (ref 40–129)
ALT SERPL-CCNC: 23 U/L (ref 10–40)
ANION GAP SERPL CALCULATED.3IONS-SCNC: 13 MMOL/L (ref 3–16)
AST SERPL-CCNC: 21 U/L (ref 15–37)
BASOPHILS ABSOLUTE: 0 K/UL (ref 0–0.2)
BASOPHILS RELATIVE PERCENT: 0.5 %
BILIRUB SERPL-MCNC: 0.3 MG/DL (ref 0–1)
BUN BLDV-MCNC: 5 MG/DL (ref 7–20)
C-REACTIVE PROTEIN: <0.3 MG/L (ref 0–5.1)
CALCIUM SERPL-MCNC: 9.5 MG/DL (ref 8.3–10.6)
CHLORIDE BLD-SCNC: 101 MMOL/L (ref 99–110)
CO2: 25 MMOL/L (ref 21–32)
CREAT SERPL-MCNC: 0.6 MG/DL (ref 0.6–1.1)
EOSINOPHILS ABSOLUTE: 0.2 K/UL (ref 0–0.6)
EOSINOPHILS RELATIVE PERCENT: 3 %
GFR AFRICAN AMERICAN: >60
GFR NON-AFRICAN AMERICAN: >60
GLOBULIN: 2.4 G/DL
GLUCOSE BLD-MCNC: 86 MG/DL (ref 70–99)
HCT VFR BLD CALC: 36.1 % (ref 36–48)
HEMOGLOBIN: 12.1 G/DL (ref 12–16)
LYMPHOCYTES ABSOLUTE: 1.3 K/UL (ref 1–5.1)
LYMPHOCYTES RELATIVE PERCENT: 22.3 %
MCH RBC QN AUTO: 29.1 PG (ref 26–34)
MCHC RBC AUTO-ENTMCNC: 33.4 G/DL (ref 31–36)
MCV RBC AUTO: 87.1 FL (ref 80–100)
MONOCYTES ABSOLUTE: 0.4 K/UL (ref 0–1.3)
MONOCYTES RELATIVE PERCENT: 6.1 %
NEUTROPHILS ABSOLUTE: 4 K/UL (ref 1.7–7.7)
NEUTROPHILS RELATIVE PERCENT: 68.1 %
PDW BLD-RTO: 16.6 % (ref 12.4–15.4)
PLATELET # BLD: 234 K/UL (ref 135–450)
PMV BLD AUTO: 9 FL (ref 5–10.5)
POTASSIUM SERPL-SCNC: 4.4 MMOL/L (ref 3.5–5.1)
RBC # BLD: 4.15 M/UL (ref 4–5.2)
SEDIMENTATION RATE, ERYTHROCYTE: 10 MM/HR (ref 0–20)
SODIUM BLD-SCNC: 139 MMOL/L (ref 136–145)
TOTAL PROTEIN: 6.8 G/DL (ref 6.4–8.2)
TSH REFLEX: 0.96 UIU/ML (ref 0.27–4.2)
WBC # BLD: 5.8 K/UL (ref 4–11)

## 2019-04-11 PROCEDURE — 36415 COLL VENOUS BLD VENIPUNCTURE: CPT | Performed by: INTERNAL MEDICINE

## 2019-04-11 PROCEDURE — 99214 OFFICE O/P EST MOD 30 MIN: CPT | Performed by: INTERNAL MEDICINE

## 2019-04-11 ASSESSMENT — ENCOUNTER SYMPTOMS
SORE THROAT: 0
DIARRHEA: 0
SINUS PRESSURE: 0
RHINORRHEA: 0
SHORTNESS OF BREATH: 0
ABDOMINAL PAIN: 0
SINUS PAIN: 0
VOMITING: 0
EYE REDNESS: 0
EYE DISCHARGE: 0
NAUSEA: 0
WHEEZING: 0
COUGH: 0

## 2019-04-11 NOTE — PROGRESS NOTES
2019     Daja Murillo (:  1970) is a 57 y.o. female, here for evaluation of the following medical concerns:    HPI  Patient presents for evaluation of headaches. She reports this is her eighth day of persistent headaches. She noticed onset of headaches when she was fasting for [place]. She's cut back on this to once a week. She denies any neurologic symptoms including vision change, however, she did have some brief tingling in one of her fingers yesterday. She continues to have issues with muscle fasciculations. She is undergoing a workup for possible lupus or neurodegenerative disease. She rates the headache as bad as a 7 out of 10, however, she reports she finally took ibuprofen for the first time yesterday and her headache is down to a 4 out of 10 this morning. She reports this is the first day that she is starting to feel some relief. She's had a little bit of nausea without vomiting. She has had some sound sensitivity. No photophobia. She has a prior history of migraines. She otherwise feels well and has no systemic symptoms. Review of Systems   Constitutional: Negative for chills and fever. HENT: Negative for congestion, ear pain, rhinorrhea, sinus pressure, sinus pain and sore throat. Eyes: Negative for discharge and redness. Respiratory: Negative for cough, shortness of breath and wheezing. Cardiovascular: Negative for chest pain, palpitations and leg swelling. Gastrointestinal: Negative for abdominal pain, diarrhea, nausea and vomiting. Musculoskeletal: Negative for myalgias. Skin: Negative for rash. Neurological: Positive for headaches. Negative for dizziness, seizures, facial asymmetry, speech difficulty, weakness, light-headedness and numbness. Intermittent issues with numbness/tingling         Prior to Visit Medications    Medication Sig Taking?  Authorizing Provider   magnesium oxide (MAG-OX) 400 MG tablet Take 400 mg by mouth daily Yes Historical Provider, MD labs including inflammatory markers to assess for possible vasculitis. She has no signs of temporal arteritis. She is making an appointment with the rheumatologist to evaluate her positive double-stranded DNA antibodies. Her neurologic exam is normal today. Her headaches do seem to be abating at this time and recommend she continue to use ibuprofen 600-800 mg every 8 hours as needed. Strict precautions were reviewed regarding when to seek medical attention for new or worsening symptoms including neurologic deficit, intractable headache. She voiced agreement and understanding of plan. We will schedule follow-up for next week to follow this to symptom resolution.  - TSH with Reflex  - CBC WITH AUTO DIFFERENTIAL; Future  - COMPREHENSIVE METABOLIC PANEL; Future  - C-REACTIVE PROTEIN; Future  - SEDIMENTATION RATE; Future    2. Positive ds-dna ab  - referral as above. May be contributing to current symptomology. Return in about 4 days (around 4/15/2019). An electronic signature was used to authenticate this note.     --Iker Sow MD on 4/11/2019 at 11:34 AM

## 2019-04-11 NOTE — TELEPHONE ENCOUNTER
PT stopped in at  after her appointment with the name of the rheumatologist to send referral to. Dr. Josephina Meckel. Fax number: [phone].

## 2019-04-11 NOTE — TELEPHONE ENCOUNTER
Called pt. She saw her pcp this am. She advised her to take ibuprofen for her symptoms. She states that she should go to the ER if she has neurological symptoms. Told her to get in with Rheumatology earlier. Her pcp feels this is lupus related and feels she should get in sooner. I spoke with pt in detail about options of getting her in sooner with Rheumatology. We went ahead and scheduled her with  for sooner appt. She is scheduled for 4/24/2019 with  for NPV. Fyi sent to both physicians.

## 2019-04-11 NOTE — TELEPHONE ENCOUNTER
Please find out how patient is doing today. Hopefully she is feeling better from her headache. I hope she got a chance to see her PCP and if needed she may need a referral to see a neurologist given by her PCP for this issue. I look forward to seeing her soon.

## 2019-04-15 ENCOUNTER — TELEPHONE (OUTPATIENT)
Dept: FAMILY MEDICINE CLINIC | Age: 49
End: 2019-04-15

## 2019-04-15 NOTE — TELEPHONE ENCOUNTER
The PT would like to know if  would be able to call her regarding her recent labs. PT knows she supposed to be seen in the office but is unable to due to work schedule.        Best call back---[phone]

## 2019-04-15 NOTE — TELEPHONE ENCOUNTER
I think an attempt to call her was made by [facility]. Her results were normal. Are her headaches improving

## 2019-04-16 ENCOUNTER — OFFICE VISIT (OUTPATIENT)
Dept: RHEUMATOLOGY | Age: 49
End: 2019-04-16
Payer: COMMERCIAL

## 2019-04-16 VITALS
DIASTOLIC BLOOD PRESSURE: 70 MMHG | HEIGHT: 71 IN | SYSTOLIC BLOOD PRESSURE: 103 MMHG | BODY MASS INDEX: 20.86 KG/M2 | WEIGHT: 149 LBS | HEART RATE: 63 BPM

## 2019-04-16 DIAGNOSIS — R76.8 POSITIVE DOUBLE STRANDED DNA ANTIBODY TEST: Primary | ICD-10-CM

## 2019-04-16 DIAGNOSIS — R76.8 POSITIVE DOUBLE STRANDED DNA ANTIBODY TEST: ICD-10-CM

## 2019-04-16 DIAGNOSIS — R25.3 MUSCLE TWITCHING: ICD-10-CM

## 2019-04-16 LAB
C3 COMPLEMENT: 76.7 MG/DL (ref 90–180)
C4 COMPLEMENT: 9.9 MG/DL (ref 10–40)
RHEUMATOID FACTOR: 12 IU/ML

## 2019-04-16 PROCEDURE — 99244 OFF/OP CNSLTJ NEW/EST MOD 40: CPT | Performed by: INTERNAL MEDICINE

## 2019-04-16 NOTE — PROGRESS NOTES
2019  Patient Name: Marcio  : 1970  Medical Record: [id number]    MEDICATIONS  Current Outpatient Medications   Medication Sig Dispense Refill    Multiple Vitamin (MULTI VITAMIN DAILY PO) Take by mouth      fish oil-omega-3 fatty acids 1000 MG capsule Take 2 g by mouth daily. No current facility-administered medications for this visit. ALLERGIES  No Known Allergies      Comments  No specialty comments available. REFERRING PHYSICIAN: Deedee Tran MD    HISTORY OF PRESENT ILLNESS  Marcio is a 57 y.o. female who is being seen for follow up evaluation of  positive double-stranded DNA. One year ago she was seeing a chiropractor and had neck and right arm injury. She saw another chiropractor and had low back injury. She developed severe anxiety after first injury. She started Zoloft and developed muscle twitching. She discontinued Zoloft but her muscle twitches continued. After the injury she felt widespread neuropathic pain and got concerned that she has multiple sclerosis. She went on autoimmune diet [PALEO DIET], pain improved but her muscle twitches continued. She went to see a neurologist, she had extensive neurological workup which was unremarkable. She also had brain MRI which was unremarkable and she had cervical spine MRI which showed mild degenerative changes. She was advised to follow-up if muscle twitches worsen. She denies muscle weakness, diplopia, dysarthria, facial droop. She was fasting for a lENT, she developed a rash on her legs along with swollen knees and wrists which got better in 5 days. She then developed a migraine headache which has been persistent for past 9 days. She never had any migraine headaches before. She went to see her primary care physician who wanted to do her blood work to rule out any underlying connective tissue disease which came back positive with low titer positive KIM 1:40, positive dsDNA by Tati.   SSA/SSB, anti-Burt, RNP, anticentromere, anti-chromatin, anti-Vandana 1, SCL 70 negative. She denies malar rash, photosensitivity, oral or nasal ulcers, pleurisy or pericarditis, alopecia, history of miscarriages or pregnancy complications or blood clots, raynaud's, joint symptoms, skin tightening, sclerodactyly, renal diseases, memory loss, seizures. She has occasional dry mouth. She denies fever, persistent night sweats. She has lost 45 pounds on KETO DIET. Her mother has factor V Leiden and sister has positive anticardiolipin. HPI  Review of Systems    REVIEW OF SYSTEMS:   Constitutional: No fevers. No fatigue and malaise. Integumentary: No rash, photosensitivity, malar rash, livedo reticularis, alopecia and Raynaud's symptoms, sclerodactyly, skin tightening  Eyes: negative for visual disturbance and persistent redness, discharge from eyes   ENT: - No tinnitus, loss of hearing, vertigo, or recurrent ear infections.  - No history of nasal/oral ulcers. - No history of dry eyes  Cardiovascular: No history of pericarditis, chest pain or murmur or palpitations  Respiratory: No shortness of breath, cough or history of interstitial lung disease. No history of pleurisy. No history of tuberculosis or atypical infections. Gastrointestinal: No history of heart burn, dysphagia or esophageal dysmotility. No change in bowel habits or any inflammatory bowel disease. Genitourinary: No history renal disease, miscarriages. Hematologic/Lymphatic: No abnormal bruising or bleeding, blood clots or swollen lymph nodes. Musculoskeletal: Denies having any swollen joints, joint pains or stiffness   Neurological: No history of seizure or focal weakness. No history of neuropathies, paresthesias or hyperesthesias, facial droop, diplopia  Psychiatric: No history of bipolar disease, depression  Endocrine: Denies any polyuria, polydipsia and osteoporosis  Allergic/Immunologic: No nasal congestion or hives.         I have reviewed patients Past medical History, Social History and Family History as mentioned in her chart and this remains unchanged fromprevious. 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    Social Needs    Financial resource strain: Not on file    Food insecurity:     Worry: Not on file     Inability: Not on file    Transportation needs:     Medical: Not on file     Non-medical: Not on file   Tobacco Use    Smoking status: Never Smoker    Smokeless tobacco: Never Used   Substance and Sexual Activity    Alcohol use: Yes     Comment: social    Drug use: No    Sexual activity: Yes     Partners: Male     Comment: Khalif   Lifestyle    Physical activity:     Days per week: Not on file     Minutes per session: Not on file    Stress: Not on file   Relationships    Social connections:     Talks on phone: Not on file     Gets together: Not on file     Attends [employer] service: Not on file     Active member of club or organization: Not on file     Attends meetings of clubs or organizations: Not on file     Relationship status: Not on file    Intimate partner violence:     Fear of current or ex partner: Not on file     Emotionally abused: Not on file     Physically abused: Not on file     Forced sexual activity: Not on file   Other Topics Concern    Not on file   Social History Narrative    Not on file     Family History   Problem Relation Age of Onset    Cancer Mother         skin benign    Other Mother         factor 5 +/bronciectisis    Heart Disease Father    Mario Augusto Cancer Father         skin benign    Other Sister         cardiolipin +         PHYSICAL EXAM   Vitals:    04/16/19 1256   BP: 131/85   Site: Right Lower Arm   Pulse: 65   Weight: 149 lb (67.6 kg)   Height: 5' 11\" (1.803 m)     Physical Exam  Constitutional:  Well developed, well nourished, no acute distress, non-toxic appearance   Musculoskeletal:    Ambulates without assistance, normal gait  Neck: Full ROM, no tenderness,supple   Upper Extremities        Shoulder: Full ROM, no crepitus        Elbow:  Full ROM, no synovitis, no deformity        Wrist: Full ROM, no synovitis, no deformity, no ulnar deviation        Fingers: No sclerodactly, no active raynaud's, no ulcers. MCPs: No synovitis, no deformity             PIPs:  No synovitis, no deformity             DIPs: No synovitis, no deformity             Nails: No pitting, no telangiectasias. Lower Extremities        Hip: Full ROM, no tenderness to palpation        Knee: Full ROM, no erythema/swelling/laxity/crepitus. Patellanot ballotable. Ankle: Full ROM, no swelling or erythema        MTPs: No swelling or erythema  Back- no tenderness. Eyes:  PERRL, extra ocular movements intact, conjunctiva normal   HEENT:  Atraumatic, normocephalic, external ears normal, oropharynx moist, no pharyngeal exudates. Respiratory:  No respiratory distress  GI:  Soft, nondistended, normal bowel sounds, nontender, noorganomegaly, no mass, no rebound, no guarding   :  No costovertebral angle tenderness   Integument:  Well hydrated, no rash or telangiectasias  Lymphatic:  No lymphadenopathy noted   Neurologic:   Alert & oriented x 3, CN 2-12 normal, no focal deficits noted. Sensations Intact. Muscle strength 5/5 proximallyand distally in upper and lower extremities.    Psychiatric:  Speech and behavior appropriate           LABS AND IMAGING  Outside data reviewed and in HPI    Lab Results   Component Value Date    WBC 5.8 04/11/2019    RBC 4.15 04/11/2019    HGB 12.1 04/11/2019    HCT 36.1 04/11/2019     04/11/2019    MCV 87.1 04/11/2019    MCH 29.1 04/11/2019    MCHC 33.4 04/11/2019    RDW 16.6 04/11/2019    SEGSPCT 77.2 08/19/2012    BANDSPCT 2.5 07/09/2012    METASPCT 0.5 07/09/2012    LYMPHOPCT 22.3 04/11/2019    MONOPCT 6.1 04/11/2019    MYELOPCT 1.0 07/09/2012    EOSPCT 2.5 07/12/2011    BASOPCT 0.5 04/11/2019    MONOSABS 0.4 04/11/2019    LYMPHSABS 1.3 04/11/2019    EOSABS 0.2 04/11/2019    BASOSABS 0.0 04/11/2019    DIFFTYPE Auto 08/19/2012       Chemistry        Component Value Date/Time     04/11/2019 1055    K 4.4 04/11/2019 1055     04/11/2019 1055    CO2 25 04/11/2019 1055    BUN 5 (L) 04/11/2019 1055    CREATININE 0.6 04/11/2019 1055        Component Value Date/Time    CALCIUM 9.5 04/11/2019 1055    ALKPHOS 44 04/11/2019 1055    AST 21 04/11/2019 1055    ALT 23 04/11/2019 1055    BILITOT 0.3 04/11/2019 1055          Lab Results   Component Value Date    SEDRATE 10 04/11/2019     Lab Results   Component Value Date    CRP <0.3 04/11/2019     Lab Results   Component Value Date    KIM POSITIVE 03/28/2019     Lab Results   Component Value Date    RF <10.0 03/28/2019     Lab Results   Component Value Date    KIM POSITIVE 03/28/2019    ANATITER <1:40 03/28/2019    ANAINT see below 03/28/2019     No results found for: DSDNAG, DSDNAIGGIFA  No results found for: SSAROAB, SSALAAB  No results found for: SMAB, RNPAB  No results found for: CENTABIGG  No results found for: C3, C4, ACE  No results found for: JO1, VITD25, [id number]  No results found for: Gorman Favre  Lab Results   Component Value Date    [id number] 707 01/24/2019     Lab Results   Component Value Date    TSHFT4 0.91 12/14/2016    TSH 1.58 01/24/2019     No results found for: VITD25    ASSESSMENT AND PLAN      Assessment/Plan:      ASSESSMENT:    1. Positive double stranded DNA antibody test    2. Muscle twitching        PLAN:     Bob Mcdermott was seen today for new patient.     Diagnoses and all orders for this visit:    Positive double stranded DNA antibody test-  History and physical examination not indicated of lupus or systemic rheumatic disease  -Low titer positive KIM <1:40  -Positive dsDNA by BHASKAR  -SSA/SSB, SCL 70, anticentromere, RNP, anti-Smith, anti-chromatin, anti-Vandana 1 antibodies negative  -I doubt she has lupus or systemic rheumatic disease positive dsDNA by BHASKAR which is most likely false positive  -I will repeat dsDNA by Crithidia method  -I will check other serologies to completely do workup  -Further management pending test results. -     Anti-DNA Antibody, Double-Stranded; Future  -     Miscellaneous Sendout 1; Future  -     C3 Complement; Future  -     C4 Complement; Future  -     Beta-2 Glycoprotein Antibodies; Future  -     Cardiolipin Antibodies IgG & IgM; Future  -     Cardiolipin Antibody, IgA; Future  -     Lupus Anticoagulant; Future  -     Cyclic Citrul Peptide Antibody, IgG; Future  -     Rheumatoid Factor; Future    Muscle twitching-extensive neurological workup in the past has been unremarkable. I would recommend following up with neurology to discuss further. Unlikely to be due to lupus or connective tissue disease         The patient indicates understanding of these issues and agrees with the plan. Return in about 3 weeks (around 5/7/2019). The risks and benefits of my recommendations, as well as other treatment options, benefits and side effects werediscussed with the patient. All questions were answered. I reviewed patient's history, referral documents and electronic medical records  Copy of consult note is being routedelectronically/faxed to referring physician         ######################################################################    I thank you for giving me theopportunity to participate in [facility]. If you have any questions or concerns please feel free to contact me.  I look forward to following  Daja along with you.      Electronically signed by: Timo Eubanks MD, 4/16/2019 1:43 PM    Documentation was done using voice recognition dragon software. Every effort was made to ensure accuracy;however, inadvertent unintentional computerized transcription errors may be present.

## 2019-04-17 LAB — ANTI-DSDNA IGG: 38 IU/ML (ref 0–9)

## 2019-04-18 LAB
ANTICARDIOLIPIN IGA ANTIBODY: 0 APL (ref 0–11)
ANTICARDIOLIPIN IGG ANTIBODY: 9 GPL (ref 0–14)
BETA-2 GLYCOPROTEIN 1 IGG ANTIBODY: 0 SGU (ref 0–20)
BETA-2 GLYCOPROTEIN 1 IGM ANTIBODY: 6 SMU (ref 0–20)
CARDIOLIPIN AB IGM: 16 MPL (ref 0–12)
DSDNA ANTIBODY TITER: NORMAL

## 2019-04-19 LAB
CCP IGG ANTIBODIES: 52 UNITS (ref 0–19)
DRVVT CONFIRMATION TEST: ABNORMAL RATIO
DRVVT SCREEN: 26 SEC (ref 33–44)
DRVVT,DIL: ABNORMAL SEC (ref 33–44)
DSDNA ANTIBODY TITER: NORMAL
HEXAGONAL PHOSPHOLIPID NEUTRALIZAT TEST: ABNORMAL
LUPUS ANTICOAG INTERP: ABNORMAL
PLT NEUTA: ABNORMAL
PT D: 13.3 SEC (ref 12–15.5)
PTT D: 38 SEC (ref 32–48)
PTT-D CORR REFLEX: ABNORMAL SEC (ref 32–48)
PTT-HEPARIN NEUTRALIZED: ABNORMAL SEC (ref 32–48)
REPTILASE TIME: ABNORMAL SEC
THROMBIN TIME: ABNORMAL SEC (ref 14.7–19.5)

## 2019-04-22 ENCOUNTER — TELEPHONE (OUTPATIENT)
Dept: RHEUMATOLOGY | Age: 49
End: 2019-04-22

## 2019-04-22 DIAGNOSIS — R79.89 LOW COMPLEMENT MEASUREMENT: Primary | ICD-10-CM

## 2019-04-22 NOTE — TELEPHONE ENCOUNTER
Notes recorded by Denise Epstein on 4/23/2019 at 1:39 PM EDT  Informed Pt of Dr Shiloh Glez notes and instructions    ------    Notes recorded by Denise Epstein on 4/22/2019 at 2:03 PM EDT  TCB  ------    Notes recorded by Randa Sands MD on 4/22/2019 at 12:48 PM EDT  Please call the patient and let her know that she has slightly low complements, positive anti-CCP antibody which is seen in patients with rheumatoid arthritis. Pushpa Contreras repeat double-stranded DNA came back negative. Eloy Rodas will repeat her complements to make sure these are not false positive tests.  I will also do urine evaluation to make sure there is no protein in the urine.

## 2019-05-03 DIAGNOSIS — R79.89 LOW COMPLEMENT MEASUREMENT: ICD-10-CM

## 2019-05-03 LAB
BILIRUBIN URINE: NEGATIVE
BLOOD, URINE: NEGATIVE
C3 COMPLEMENT: 79.4 MG/DL (ref 90–180)
C4 COMPLEMENT: 10.9 MG/DL (ref 10–40)
CLARITY: CLEAR
COLOR: YELLOW
CREATININE URINE: 15.1 MG/DL (ref 28–259)
GLUCOSE URINE: NEGATIVE MG/DL
KETONES, URINE: NEGATIVE MG/DL
LEUKOCYTE ESTERASE, URINE: NEGATIVE
MICROSCOPIC EXAMINATION: NORMAL
NITRITE, URINE: NEGATIVE
PH UA: 6.5 (ref 5–8)
PROTEIN PROTEIN: <4 MG/DL
PROTEIN UA: NEGATIVE MG/DL
SPECIFIC GRAVITY UA: 1 (ref 1–1.03)
URINE REFLEX TO CULTURE: NORMAL
URINE TYPE: NORMAL
UROBILINOGEN, URINE: 0.2 E.U./DL

## 2019-05-06 LAB — COMPLEMENT TOTAL (CH50): 44 CAE UNITS (ref 60–144)

## 2019-05-08 ENCOUNTER — TELEPHONE (OUTPATIENT)
Dept: FAMILY MEDICINE CLINIC | Age: 49
End: 2019-05-08

## 2019-05-08 NOTE — PROGRESS NOTES
2019  Patient Name: Marcio  : 1970  Medical Record: [id number]    MEDICATIONS  Current Outpatient Medications   Medication Sig Dispense Refill    Multiple Vitamin (MULTI VITAMIN DAILY PO) Take by mouth      fish oil-omega-3 fatty acids 1000 MG capsule Take 2 g by mouth daily. No current facility-administered medications for this visit. ALLERGIES  No Known Allergies      Comments  No specialty comments available. Background History:  Marcio is a 57 y.o. female who is being seen for follow up evaluation of  positive double-stranded DNA. One year ago she was seeing a chiropractor and had neck and right arm injury. She saw another chiropractor and had low back injury. She developed severe anxiety after first injury. She started Zoloft and developed muscle twitching. She discontinued Zoloft but her muscle twitches continued. After the injury she felt widespread neuropathic pain and got concerned that she has multiple sclerosis. She went on autoimmune diet [PALEO DIET], pain improved but her muscle twitches continued. She went to see a neurologist, she had extensive neurological workup which was unremarkable. She also had brain MRI which was unremarkable and she had cervical spine MRI which showed mild degenerative changes. She was advised to follow-up if muscle twitches worsen. She denies muscle weakness, diplopia, dysarthria, facial droop. She was fasting for a lENT, she developed a rash on her legs along with swollen knees and wrists which got better in 5 days. She then developed a migraine headache which has been persistent for past 9 days. She never had any migraine headaches before. She went to see her primary care physician who wanted to do her blood work to rule out any underlying connective tissue disease which came back positive with low titer positive KIM 1:40, positive dsDNA by Tati.   SSA/SSB, anti-Smith, RNP, anticentromere, anti-chromatin, change in bowel habits or any inflammatory bowel disease. Genitourinary: No history renal disease, miscarriages. Hematologic/Lymphatic: No abnormal bruising or bleeding, blood clots or swollen lymph nodes. Musculoskeletal: Denies having any swollen joints, joint pains or stiffness   Neurological: No history of seizure or focal weakness. No history of neuropathies, paresthesias or hyperesthesias, facial droop, diplopia  Psychiatric: No history of bipolar disease, depression  Endocrine: Denies any polyuria, polydipsia and osteoporosis  Allergic/Immunologic: No nasal congestion or hives. I have reviewed patients Past medical History, Social History and Family History as mentioned in her chart and this remains unchanged fromprevious. 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    Social Needs    Financial resource strain: Not on file    Food insecurity:     Worry: Not on file     Inability: Not on file    Transportation needs:     Medical: Not on file     Non-medical: Not on file   Tobacco Use    Smoking status: Never Smoker    Smokeless tobacco: Never Used   Substance and Sexual Activity    Alcohol use: Yes     Comment: social    Drug use: No    Sexual activity: Yes     Partners: Male     Comment: Khalif   Lifestyle    Physical activity:     Days per week: Not on file     Minutes per session: Not on file    Stress: Not on file   Relationships    Social connections:     Talks on phone: Not on file     Gets together: Not on file Attends [employer] service: Not on file     Active member of club or organization: Not on file     Attends meetings of clubs or organizations: Not on file     Relationship status: Not on file    Intimate partner violence:     Fear of current or ex partner: Not on file     Emotionally abused: Not on file     Physically abused: Not on file     Forced sexual activity: Not on file   Other Topics Concern    Not on file   Social History Narrative    Not on file     Family History   Problem Relation Age of Onset    Cancer Mother         skin benign    Other Mother         factor 5 +/bronciectisis    Heart Disease Father     Cancer Father         skin benign    Other Sister         cardiolipin +         PHYSICAL EXAM   Vitals:    05/09/19 0906   BP: 118/76   Site: Right Lower Arm   Pulse: 66   Weight: 151 lb (68.5 kg)   Height: 5' 11\" (1.803 m)     Physical Exam  Constitutional:  Well developed, well nourished, no acute distress, non-toxic appearance   Musculoskeletal:    Ambulates without assistance, normal gait  Neck: Full ROM, no tenderness,supple   Upper Extremities        Shoulder: Full ROM, no crepitus        Elbow:  Full ROM, no synovitis, no deformity        Wrist: Full ROM, no synovitis, no deformity, no ulnar deviation        Fingers: No sclerodactly, no active raynaud's, no ulcers. MCPs: No synovitis, no deformity             PIPs:  No synovitis, no deformity             DIPs: No synovitis, no deformity             Nails: No pitting, no telangiectasias. Lower Extremities        Hip: Full ROM, no tenderness to palpation        Knee: Full ROM, no erythema/swelling/laxity/crepitus. Patellanot ballotable. Ankle: Full ROM, no swelling or erythema        MTPs: No swelling or erythema  Back- no tenderness. Eyes:  PERRL, extra ocular movements intact, conjunctiva normal   HEENT:  Atraumatic, normocephalic, external ears normal, oropharynx moist, no pharyngeal exudates.    Respiratory:  No respiratory distress  GI:  Soft, nondistended, normal bowel sounds, nontender, noorganomegaly, no mass, no rebound, no guarding   :  No costovertebral angle tenderness   Integument:  Well hydrated, no rash or telangiectasias  Lymphatic:  No lymphadenopathy noted   Neurologic:   Alert & oriented x 3, CN 2-12 normal, no focal deficits noted. Sensations Intact. Muscle strength 5/5 proximallyand distally in upper and lower extremities.    Psychiatric:  Speech and behavior appropriate           LABS AND IMAGING  Outside data reviewed and in HPI    Lab Results   Component Value Date    WBC 5.8 04/11/2019    RBC 4.15 04/11/2019    HGB 12.1 04/11/2019    HCT 36.1 04/11/2019     04/11/2019    MCV 87.1 04/11/2019    MCH 29.1 04/11/2019    MCHC 33.4 04/11/2019    RDW 16.6 04/11/2019    SEGSPCT 77.2 08/19/2012    BANDSPCT 2.5 07/09/2012    METASPCT 0.5 07/09/2012    LYMPHOPCT 22.3 04/11/2019    MONOPCT 6.1 04/11/2019    MYELOPCT 1.0 07/09/2012    EOSPCT 2.5 07/12/2011    BASOPCT 0.5 04/11/2019    MONOSABS 0.4 04/11/2019    LYMPHSABS 1.3 04/11/2019    EOSABS 0.2 04/11/2019    BASOSABS 0.0 04/11/2019    DIFFTYPE Auto 08/19/2012       Chemistry        Component Value Date/Time     04/11/2019 1055    K 4.4 04/11/2019 1055     04/11/2019 1055    CO2 25 04/11/2019 1055    BUN 5 (L) 04/11/2019 1055    CREATININE 0.6 04/11/2019 1055        Component Value Date/Time    CALCIUM 9.5 04/11/2019 1055    ALKPHOS 44 04/11/2019 1055    AST 21 04/11/2019 1055    ALT 23 04/11/2019 1055    BILITOT 0.3 04/11/2019 1055          Lab Results   Component Value Date    SEDRATE 10 04/11/2019     Lab Results   Component Value Date    CRP <0.3 04/11/2019     Lab Results   Component Value Date    KIM POSITIVE 03/28/2019    C3 79.4 05/03/2019    C3 76.7 04/16/2019    C4 10.9 05/03/2019    C4 9.9 04/16/2019     Lab Results   Component Value Date    RF 12.0 04/16/2019    CCPABIGG 52 04/16/2019     Lab Results   Component Value Date    KIM POSITIVE 03/28/2019    ANATITER <1:40 03/28/2019    ANAINT see below 03/28/2019     Lab Results   Component Value Date    DSDNAIGGIFA <1:10 04/16/2019    [address] [facility] <1:10 04/16/2019     No results found for: Scot Berman  No results found for: SMAB, RNPAB  No results found for: CENTABIGG  Lab Results   Component Value Date    C3 79.4 05/03/2019    C4 10.9 05/03/2019     No results found for: JO1, VITD25, [id number]  No results found for: Maura Min Results   Component Value Date    [id number] 707 01/24/2019     Lab Results   Component Value Date    TSHFT4 0.91 12/14/2016    TSH 1.58 01/24/2019     No results found for: VITD25    ASSESSMENT AND PLAN      Assessment/Plan:      ASSESSMENT:    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 Positive double stranded DNA antibody test    3. Muscle twitching    4. 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 Doubt immune complex mediated. No evidence of lupus on history and physical examination.  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  -We will check other complements  -Will refer to pulmonology for evaluation  - C1Q BINDING ASSAY; Future  - C1 ESTERASE INHIBITOR TOTAL; Future  - C2 COMPLEMENT; Future  - Cryoglobulin; Future  - IgG, IgA, IgM; Future  - AFL - Francia Gibson MD, Allergy & Immunology, [facility]    2. Positive double stranded DNA antibody test  Low titer positive KIM less than 1:40  DsDNA positive by BHASKAR and negative by CITHIDRIA, low titer positive anticardiolipin IgG 16. Beta-2 glycoprotein, lupus antibody negative. SSA/SSB, SCL 70, anticentromere, RNP, anti-Smith, anti-chromatin, anti-Vandana 1 antibodies negative. No other signs and symptoms of lupus or systemic rheumatic disease. We will continue to monitor periodically  -  3. Muscle twitching  extensive neurological workup in the past has been unremarkable. I would recommend following up with neurology to discuss further. Unlikely to be due to lupus or connective tissue disease      4. Cyclic citrullinated peptide (CCP) antibody positive  RF negative, positive anti-CCP antibody. No joint symptoms, no synovitis on joint exam.  We will continue to monitor periodically. The patient indicates understanding of these issues and agrees with the plan. Return in about 6 months (around 11/9/2019). The risks and benefits of my recommendations, as well as other treatment options, benefits and side effects werediscussed with the patient. All questions were answered. ######################################################################    I thank you for giving me theopportunity to participate in [address] [place] [address]. If you have any questions or concerns please feel free to contact me. I look forward to following  Daja along with you. Electronically signed by: Nino Amezcua MD, 5/9/2019 12:42 PM    Documentation was done using voice recognition dragon software. Every effort was made to ensure accuracy;however, inadvertent unintentional computerized transcription errors may be present.

## 2019-05-08 NOTE — TELEPHONE ENCOUNTER
Is extremely likely to be viral give contagious nature through family; abx almost certainly not needed in - she should consider trying Visine OTC for relief of symptoms

## 2019-05-09 ENCOUNTER — OFFICE VISIT (OUTPATIENT)
Dept: RHEUMATOLOGY | Age: 49
End: 2019-05-09
Payer: COMMERCIAL

## 2019-05-09 VITALS
HEIGHT: 71 IN | SYSTOLIC BLOOD PRESSURE: 118 MMHG | HEART RATE: 66 BPM | WEIGHT: 151 LBS | BODY MASS INDEX: 21.14 KG/M2 | DIASTOLIC BLOOD PRESSURE: 76 MMHG

## 2019-05-09 DIAGNOSIS — R76.8 POSITIVE DOUBLE STRANDED DNA ANTIBODY TEST: ICD-10-CM

## 2019-05-09 DIAGNOSIS — D84.1 COMPLEMENT ABNORMALITY (HCC): Primary | ICD-10-CM

## 2019-05-09 DIAGNOSIS — R76.8 CYCLIC CITRULLINATED PEPTIDE (CCP) ANTIBODY POSITIVE: ICD-10-CM

## 2019-05-09 DIAGNOSIS — R25.3 MUSCLE TWITCHING: ICD-10-CM

## 2019-05-09 PROCEDURE — 99214 OFFICE O/P EST MOD 30 MIN: CPT | Performed by: INTERNAL MEDICINE

## 2019-05-29 ENCOUNTER — TELEPHONE (OUTPATIENT)
Dept: FAMILY MEDICINE CLINIC | Age: 49
End: 2019-05-29

## 2019-05-29 NOTE — TELEPHONE ENCOUNTER
The PT says that she is feeling tired, achy and Nauseous for about a month. PT wanted to discuss her recent labs and try to determine what is causing this. PT would like to be added to tomorrows schedule if possible. Best call back--- [phone]      PT says if she's able to be seen but doesn't answer when called just leave a message with the time and she'll show up. PT said she can show up anytime between 9-12 but would be willing to work with whatever is available.

## 2019-05-30 ENCOUNTER — OFFICE VISIT (OUTPATIENT)
Dept: FAMILY MEDICINE CLINIC | Age: 49
End: 2019-05-30
Payer: COMMERCIAL

## 2019-05-30 VITALS
BODY MASS INDEX: 20.9 KG/M2 | WEIGHT: 149.25 LBS | HEART RATE: 56 BPM | OXYGEN SATURATION: 99 % | SYSTOLIC BLOOD PRESSURE: 110 MMHG | DIASTOLIC BLOOD PRESSURE: 74 MMHG | HEIGHT: 71 IN

## 2019-05-30 DIAGNOSIS — R51.9 PERSISTENT HEADACHES: Primary | ICD-10-CM

## 2019-05-30 PROCEDURE — 99213 OFFICE O/P EST LOW 20 MIN: CPT | Performed by: INTERNAL MEDICINE

## 2019-05-30 RX ORDER — MAGNESIUM OXIDE 400 MG/1
400 TABLET ORAL DAILY
COMMUNITY
End: 2019-07-23 | Stop reason: ALTCHOICE

## 2019-05-30 ASSESSMENT — ENCOUNTER SYMPTOMS
VOICE CHANGE: 0
NAUSEA: 1
PHOTOPHOBIA: 0
ABDOMINAL PAIN: 0
TROUBLE SWALLOWING: 0
VOMITING: 0
COUGH: 0
DIARRHEA: 0
SHORTNESS OF BREATH: 0
SORE THROAT: 0

## 2019-05-30 NOTE — PROGRESS NOTES
2019     Daja Murillo (:  1970) is a 57 y.o. female, here for evaluation of the following medical concerns:    HPI  Patient presents for evaluation of ongoing recent illness. We worked this up previously with lab work that revealed a positive anti-double-stranded DNA. She also had a positive anti-CCP antibody. She was referred to rheumatology who plans to monitor this periodically as she did not have any symptoms of lupus or rheumatoid arthritis at that time. She was found to have low complement levels and has been referred to immunology. Her muscle twitching is not thought to be rheumatological in etiology and she has been referred back to neurology. She did see the neurologist again and he told her it may be related to a ketogenic diet. She did go back to a regular diet the last 1.5 weeks and has noted improvement in her muscle twitching. She reports she has been feeling fatigued, achy, and nauseated over the last month. She also has persistent headaches. She reports it causes her to feel achy. She feels excessively fatigued. She has been noting these to be as severe as a 7/10. She has noted this almost every day. She is taking iburpofen for the HA with some relief. No vision change, numbness/tingling, weakness. The headaches have improved a bit with her dietary change. She is adding back carbohydrates. She has no h/o migraine HA. She did have MRI of her head/neck in November that were normal.     Review of Systems   Constitutional: Positive for fatigue. Negative for chills, fever and unexpected weight change. HENT: Negative for hearing loss, sore throat, trouble swallowing and voice change. Eyes: Negative for photophobia and visual disturbance. Respiratory: Negative for cough and shortness of breath. Cardiovascular: Negative for chest pain. Gastrointestinal: Positive for nausea. Negative for abdominal pain, diarrhea and vomiting.    Endocrine: Negative for cold intolerance, heat regular rhythm and normal heart sounds. No murmur heard. Pulmonary/Chest: Effort normal and breath sounds normal. No respiratory distress. She has no wheezes. She has no rales. Abdominal: Soft. Bowel sounds are normal. There is no tenderness. Musculoskeletal: She exhibits no tenderness. Lymphadenopathy:     She has no cervical adenopathy. Neurological: No cranial nerve deficit. Skin: Skin is warm and dry. Capillary refill takes less than 2 seconds. No rash noted. ASSESSMENT/PLAN:  1. Persistent headaches  . Recommend she eat at minimum, 50 g of carbohydrates per day. Headaches are improving with increasing carbohydrate intake and coming off a ketogenic diet, however, they're not resolved completely. Patient will notify me if headaches persist beyond the next 2 weeks at which time I would recommend a MRI. She will plan to follow up with rheumatology as planned. Return in about 2 weeks (around 6/13/2019). An electronic signature was used to authenticate this note.     --Servando Britt MD on 5/30/2019 at 11:06 AM

## 2019-06-13 ENCOUNTER — TELEPHONE (OUTPATIENT)
Dept: FAMILY MEDICINE CLINIC | Age: 49
End: 2019-06-13

## 2019-06-13 ENCOUNTER — OFFICE VISIT (OUTPATIENT)
Dept: FAMILY MEDICINE CLINIC | Age: 49
End: 2019-06-13
Payer: COMMERCIAL

## 2019-06-13 VITALS
SYSTOLIC BLOOD PRESSURE: 116 MMHG | OXYGEN SATURATION: 99 % | HEART RATE: 78 BPM | WEIGHT: 148 LBS | DIASTOLIC BLOOD PRESSURE: 78 MMHG | BODY MASS INDEX: 20.72 KG/M2 | HEIGHT: 71 IN

## 2019-06-13 DIAGNOSIS — R11.0 NAUSEA: Primary | ICD-10-CM

## 2019-06-13 DIAGNOSIS — G89.29 CHRONIC BILATERAL THORACIC BACK PAIN: ICD-10-CM

## 2019-06-13 DIAGNOSIS — R25.3 MUSCLE TWITCHING: ICD-10-CM

## 2019-06-13 DIAGNOSIS — M54.6 CHRONIC BILATERAL THORACIC BACK PAIN: ICD-10-CM

## 2019-06-13 LAB
A/G RATIO: 1.9 (ref 1.1–2.2)
ALBUMIN SERPL-MCNC: 4.6 G/DL (ref 3.4–5)
ALP BLD-CCNC: 37 U/L (ref 40–129)
ALT SERPL-CCNC: 19 U/L (ref 10–40)
ANION GAP SERPL CALCULATED.3IONS-SCNC: 13 MMOL/L (ref 3–16)
AST SERPL-CCNC: 19 U/L (ref 15–37)
BILIRUB SERPL-MCNC: 0.5 MG/DL (ref 0–1)
BUN BLDV-MCNC: 7 MG/DL (ref 7–20)
CALCIUM SERPL-MCNC: 9.8 MG/DL (ref 8.3–10.6)
CHLORIDE BLD-SCNC: 101 MMOL/L (ref 99–110)
CO2: 23 MMOL/L (ref 21–32)
CREAT SERPL-MCNC: 0.7 MG/DL (ref 0.6–1.1)
GFR AFRICAN AMERICAN: >60
GFR NON-AFRICAN AMERICAN: >60
GLOBULIN: 2.4 G/DL
GLUCOSE BLD-MCNC: 102 MG/DL (ref 70–99)
POTASSIUM SERPL-SCNC: 4.3 MMOL/L (ref 3.5–5.1)
SODIUM BLD-SCNC: 137 MMOL/L (ref 136–145)
TOTAL CK: 58 U/L (ref 26–192)
TOTAL PROTEIN: 7 G/DL (ref 6.4–8.2)

## 2019-06-13 PROCEDURE — 99214 OFFICE O/P EST MOD 30 MIN: CPT | Performed by: INTERNAL MEDICINE

## 2019-06-13 PROCEDURE — 36415 COLL VENOUS BLD VENIPUNCTURE: CPT | Performed by: INTERNAL MEDICINE

## 2019-06-13 RX ORDER — OMEPRAZOLE 40 MG/1
40 CAPSULE, DELAYED RELEASE ORAL
Qty: 30 CAPSULE | Refills: 0 | Status: SHIPPED | OUTPATIENT
Start: 2019-06-13 | End: 2019-07-09

## 2019-06-13 ASSESSMENT — ENCOUNTER SYMPTOMS
NAUSEA: 1
SINUS PAIN: 0
BACK PAIN: 1
EYE REDNESS: 0
DIARRHEA: 0
COUGH: 0
SINUS PRESSURE: 0
EYE DISCHARGE: 0
RHINORRHEA: 0
ABDOMINAL PAIN: 0
WHEEZING: 0
SHORTNESS OF BREATH: 0
VOMITING: 1
SORE THROAT: 0

## 2019-06-13 NOTE — PROGRESS NOTES
2019     Daja Murillo (:  1970) is a 57 y.o. female, here for evaluation of the following medical concerns:    HPI  Patient presents for evaluation of nausea. She has been noticing this more over the last couple weeks. She has woken up a few nights dry heaving. No diarrhea. No abdominal pain. No fever or chills. She does admit to some reflux. He has had ongoing issues with muscle twitching. This was thought to be related to her ketogenic diet per the neurologist, however, she reports that it did not improve once introducing carbohydrates again. She reports that when she did introduce carbohydrates again she began having thoracic back pain. She describes a lightening sensation that radiates from her mid back. She reports that she had this same issue in September before going on the ketogenic diet. She feels that the ketogenic diet does help this problem. She continues to have some intermittent tingling in her fingertips and toes. This had been worked up previously and she had a cervical MRI in the past that revealed degenerative changes and an EMG that was consistent with tennis elbow. She has had multiple symptoms throughout the past year without a clear underlying diagnosis. She still feels that something neurologic may be going on. She is worried about possible ALS. She did recently have issues with joint pains and had positive autoimmune work-up, however, she now recalls that her children had fifth disease at that time and she believes this may have been related. She had joint pain and a rash on her face as well. She has not had any ongoing joint pain. Review of Systems   Constitutional: Negative for chills and fever. HENT: Negative for congestion, ear pain, rhinorrhea, sinus pressure, sinus pain and sore throat. Eyes: Negative for discharge and redness. Respiratory: Negative for cough, shortness of breath and wheezing.     Cardiovascular: Negative for chest pain, palpitations and leg swelling. Gastrointestinal: Positive for nausea and vomiting. Negative for abdominal pain and diarrhea. Genitourinary: Negative for dysuria. Musculoskeletal: Positive for back pain. Negative for myalgias. Skin: Negative for rash. Neurological: Positive for tremors and numbness. Prior to Visit Medications    Medication Sig Taking? Authorizing Provider   omeprazole (PRILOSEC) 40 MG delayed release capsule Take 1 capsule by mouth every morning (before breakfast) Yes Beto EstimMD kody   magnesium oxide (MAG-OX) 400 MG tablet Take 400 mg by mouth daily Yes Historical Provider, MD   Multiple Vitamin (MULTI VITAMIN DAILY PO) Take by mouth Yes Historical Provider, MD   fish oil-omega-3 fatty acids 1000 MG capsule Take 2 g by mouth daily. Yes Historical Provider, MD        Social History     Tobacco Use    Smoking status: Never Smoker    Smokeless tobacco: Never Used   Substance Use Topics    Alcohol use: Yes     Comment: social        Vitals:    06/13/19 1141   BP: 116/78   Pulse: 78   SpO2: 99%   Weight: 148 lb (67.1 kg)   Height: 5' 11\" (1.803 m)     Estimated body mass index is 20.64 kg/m² as calculated from the following:    Height as of this encounter: 5' 11\" (1.803 m). Weight as of this encounter: 148 lb (67.1 kg). Physical Exam   Constitutional: She appears well-developed and well-nourished. No distress. HENT:   Head: Normocephalic and atraumatic. Right Ear: External ear normal.   Left Ear: External ear normal.   Nose: Nose normal.   Mouth/Throat: Oropharynx is clear and moist.   Eyes: Pupils are equal, round, and reactive to light. Conjunctivae are normal. Right eye exhibits no discharge. Left eye exhibits no discharge. Neck: Neck supple. Cardiovascular: Normal rate, regular rhythm and normal heart sounds. No murmur heard. Pulmonary/Chest: Effort normal and breath sounds normal. No respiratory distress. She has no wheezes. She has no rales.    Abdominal:

## 2019-06-17 LAB — CELIAC PANEL: 7 UNITS (ref 0–19)

## 2019-06-18 NOTE — PROGRESS NOTES
2019  Patient Name: Elaine Taveras  : 1970  Medical Record: [id number]    MEDICATIONS  Current Outpatient Medications   Medication Sig Dispense Refill    omeprazole (PRILOSEC) 40 MG delayed release capsule Take 1 capsule by mouth every morning (before breakfast) 30 capsule 0    magnesium oxide (MAG-OX) 400 MG tablet Take 400 mg by mouth daily      Multiple Vitamin (MULTI VITAMIN DAILY PO) Take by mouth      fish oil-omega-3 fatty acids 1000 MG capsule Take 2 g by mouth daily. No current facility-administered medications for this visit. ALLERGIES  No Known Allergies      Comments  No specialty comments available. Background History:  Elaine Taveras is a 57 y.o. female who is being seen for follow up evaluation of  positive double-stranded DNA. One year ago she was seeing a chiropractor and had neck and right arm injury. She saw another chiropractor and had low back injury. She developed severe anxiety after first injury. She started Zoloft and developed muscle twitching. She discontinued Zoloft but her muscle twitches continued. After the injury she felt widespread neuropathic pain and got concerned that she has multiple sclerosis. She went on autoimmune diet [PALEO DIET], pain improved but her muscle twitches continued. She went to see a neurologist, she had extensive neurological workup which was unremarkable. She also had brain MRI which was unremarkable and she had cervical spine MRI which showed mild degenerative changes. She was advised to follow-up if muscle twitches worsen. She denies muscle weakness, diplopia, dysarthria, facial droop. She was fasting for a lENT, she developed a rash on her legs along with swollen knees and wrists which got better in 5 days. She then developed a migraine headache which has been persistent for past 9 days. She never had any migraine headaches before.   She went to see her primary care physician who wanted to do her blood work to rule out any underlying connective tissue disease which came back positive with low titer positive KIM 1:40, positive dsDNA by Bhaskar. SSA/SSB, anti-Smith, RNP, anticentromere, anti-chromatin, anti-Vandana 1, SCL 70 negative. She denies malar rash, photosensitivity, oral or nasal ulcers, pleurisy or pericarditis, alopecia, history of miscarriages or pregnancy complications or blood clots, raynaud's, joint symptoms, skin tightening, sclerodactyly, renal diseases, memory loss, seizures. She has occasional dry mouth. She denies fever, persistent night sweats. She has lost 45 pounds on KETO DIET. Her mother has factor V Leiden and sister has positive anticardiolipin. Interim history: She presents for follow-up of positive dsDNA, anti-CCP antibody. She continues to muscle twitching. She had extensive workup in the past which was unremarkable. She has positive anti-CCP antibody, RF negative, denies joint pain or swelling or stiffness. DsDNA positive by BHASKAR and negative by cithidria. Low titer positive anticardiolipin IgG 16, beta-2 glycoprotein, lupus anticoagulant negative. Other serologies negative. No other signs and symptoms concerning for lupus or systemic rheumatic disease. Headache improved. She also has low CH50, C3, C4. She denies any liver disease. She denies any recurrent infections. No rash, wrist or foot drop. She was referred to pulmonology but has not made an appointment yet. She is concerned that her abnormal labs were due to  parvovirus infection. She has read some articles which has shown that parvovirus infection can cause lupus/connective tissue disease  HPI  Review of Systems    REVIEW OF SYSTEMS:   Constitutional: No fevers. No fatigue and malaise.   Integumentary: No rash, photosensitivity, malar rash, livedo reticularis, alopecia and Raynaud's symptoms, sclerodactyly, skin tightening  Eyes: negative for visual disturbance and persistent redness, discharge from eyes   ENT: - No tinnitus, loss of hearing, vertigo, or recurrent ear infections.  - No history of nasal/oral ulcers. - No history of dry eyes  Cardiovascular: No history of pericarditis, chest pain or murmur or palpitations  Respiratory: No shortness of breath, cough or history of interstitial lung disease. No history of pleurisy. No history of tuberculosis or atypical infections. Gastrointestinal: No history of heart burn, dysphagia or esophageal dysmotility. No change in bowel habits or any inflammatory bowel disease. Genitourinary: No history renal disease, miscarriages. Hematologic/Lymphatic: No abnormal bruising or bleeding, blood clots or swollen lymph nodes. Musculoskeletal: Denies having any swollen joints, joint pains or stiffness   Neurological: No history of seizure or focal weakness. No history of neuropathies, paresthesias or hyperesthesias, facial droop, diplopia  Psychiatric: No history of bipolar disease, depression  Endocrine: Denies any polyuria, polydipsia and osteoporosis  Allergic/Immunologic: No nasal congestion or hives. I have reviewed patients Past medical History, Social History and Family History as mentioned in her chart and this remains unchanged fromprevious. 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    Social Needs    Financial resource strain: Not on file    Food insecurity:     Worry: Not on file     Inability: Not on file    Transportation needs:     Medical: Not on file     Non-medical: Not on file   Tobacco Use    Smoking status: Never Smoker    Smokeless tobacco: Never Used   Substance and Sexual Activity    Alcohol use: Yes     Comment: social    Drug use: No    Sexual activity: Yes     Partners: Male     Comment: Khalif   Lifestyle    Physical activity:     Days per week: Not on file     Minutes per session: Not on file    Stress: Not on file   Relationships    Social connections:     Talks on phone: Not on file     Gets together: Not on file     Attends [employer] service: Not on file     Active member of club or organization: Not on file     Attends meetings of clubs or organizations: Not on file     Relationship status: Not on file    Intimate partner violence:     Fear of current or ex partner: Not on file     Emotionally abused: Not on file     Physically abused: Not on file     Forced sexual activity: Not on file   Other Topics Concern    Not on file   Social History Narrative    Not on file     Family History   Problem Relation Age of Onset    Cancer Mother         skin benign    Other Mother         factor 5 +/bronciectisis    Heart Disease Father     Cancer Father         skin benign    Other Sister         cardiolipin +         PHYSICAL EXAM   Vitals:    06/19/19 1530   BP: 125/83   Site: Left Lower Arm   Pulse: 71   Weight: 147 lb (66.7 kg)   Height: 5' 11\" (1.803 m)     Physical Exam  Constitutional:  Well developed, well nourished, no acute distress, non-toxic appearance   Musculoskeletal:    Ambulates without assistance, normal gait  Neck: Full ROM, no tenderness,supple   Upper Extremities        Shoulder: Full ROM, no crepitus        Elbow:  Full ROM, no synovitis, no deformity        Wrist: Full ROM, no synovitis, no deformity, no ulnar deviation        Fingers: No sclerodactly, no active raynaud's, no ulcers.             MCPs: No synovitis, no deformity             PIPs:  No synovitis, no deformity             DIPs: No synovitis, no deformity             Nails: No pitting, no telangiectasias. Lower Extremities        Hip: Full ROM, no tenderness to palpation        Knee: Full ROM, no erythema/swelling/laxity/crepitus. Patellanot ballotable. Ankle: Full ROM, no swelling or erythema        MTPs: No swelling or erythema  Back- no tenderness. Eyes:  PERRL, extra ocular movements intact, conjunctiva normal   HEENT:  Atraumatic, normocephalic, external ears normal, oropharynx moist, no pharyngeal exudates. Respiratory:  No respiratory distress  GI:  Soft, nondistended, normal bowel sounds, nontender, noorganomegaly, no mass, no rebound, no guarding   :  No costovertebral angle tenderness   Integument:  Well hydrated, no rash or telangiectasias  Lymphatic:  No lymphadenopathy noted   Neurologic:   Alert & oriented x 3, CN 2-12 normal, no focal deficits noted. Sensations Intact. Muscle strength 5/5 proximallyand distally in upper and lower extremities.    Psychiatric:  Speech and behavior appropriate           LABS AND IMAGING  Outside data reviewed and in HPI    Lab Results   Component Value Date    WBC 5.8 04/11/2019    RBC 4.15 04/11/2019    HGB 12.1 04/11/2019    HCT 36.1 04/11/2019     04/11/2019    MCV 87.1 04/11/2019    MCH 29.1 04/11/2019    MCHC 33.4 04/11/2019    RDW 16.6 04/11/2019    SEGSPCT 77.2 08/19/2012    BANDSPCT 2.5 07/09/2012    METASPCT 0.5 07/09/2012    LYMPHOPCT 22.3 04/11/2019    MONOPCT 6.1 04/11/2019    MYELOPCT 1.0 07/09/2012    EOSPCT 2.5 07/12/2011    BASOPCT 0.5 04/11/2019    MONOSABS 0.4 04/11/2019    LYMPHSABS 1.3 04/11/2019    EOSABS 0.2 04/11/2019    BASOSABS 0.0 04/11/2019    DIFFTYPE Auto 08/19/2012       Chemistry        Component Value Date/Time     06/13/2019 1215    K 4.3 06/13/2019 1215     06/13/2019 1215    CO2 23 06/13/2019 1215    BUN 7 06/13/2019 1215    CREATININE 0.7 06/13/2019 1215        Component Value Date/Time    CALCIUM 9.8 06/13/2019 1215    ALKPHOS 37 (L) 06/13/2019 1215    AST 19 06/13/2019 1215    ALT 19 06/13/2019 1215    BILITOT 0.5 06/13/2019 1215          Lab Results   Component Value Date    SEDRATE 10 04/11/2019     Lab Results   Component Value Date    CRP <0.3 04/11/2019     Lab Results   Component Value Date    KIM POSITIVE 03/28/2019    C3 79.4 05/03/2019    C3 76.7 04/16/2019    C4 10.9 05/03/2019    C4 9.9 04/16/2019     Lab Results   Component Value Date    RF 12.0 04/16/2019    CCPABIGG 52 04/16/2019     Lab Results   Component Value Date    KIM POSITIVE 03/28/2019    ANATITER <1:40 03/28/2019    ANAINT see below 03/28/2019     Lab Results   Component Value Date    DSDNAIGGIFA <1:10 04/16/2019    [address] <1:10 04/16/2019     No results found for: Alexandr Ket  No results found for: SMAB, RNPAB  No results found for: CENTABIGG  Lab Results   Component Value Date    C3 79.4 05/03/2019    C4 10.9 05/03/2019     No results found for: JO1, VITD25, BCI46CVEDV  No results found for: Adalberto Floresrles Results   Component Value Date    YPBMJFHE64 707 01/24/2019     Lab Results   Component Value Date    TSHFT4 0.91 12/14/2016    TSH 1.58 01/24/2019     No results found for: VITD25    ASSESSMENT AND PLAN      Assessment/Plan:      ASSESSMENT:    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 Muscle twitching    3. Chronic bilateral thoracic back pain    4. Positive double stranded DNA antibody test    5. 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 Doubt immune complex mediated. No evidence of lupus on history and physical examination.  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  -Concern for parvovirus causing autoimmune disease/connective tissue disease. She was recommended that we have to exclude other etiologies before we can attribute lab abnormalities to parvovirus infection  -We will repeat CH 50, C3-C4 and check other complements.   If persistently low advised to make an

## 2019-06-19 ENCOUNTER — OFFICE VISIT (OUTPATIENT)
Dept: RHEUMATOLOGY | Age: 49
End: 2019-06-19
Payer: COMMERCIAL

## 2019-06-19 VITALS
DIASTOLIC BLOOD PRESSURE: 83 MMHG | WEIGHT: 147 LBS | BODY MASS INDEX: 20.58 KG/M2 | HEIGHT: 71 IN | HEART RATE: 71 BPM | SYSTOLIC BLOOD PRESSURE: 125 MMHG

## 2019-06-19 DIAGNOSIS — D84.1 COMPLEMENT ABNORMALITY (HCC): Primary | ICD-10-CM

## 2019-06-19 DIAGNOSIS — M54.6 CHRONIC BILATERAL THORACIC BACK PAIN: ICD-10-CM

## 2019-06-19 DIAGNOSIS — R25.3 MUSCLE TWITCHING: ICD-10-CM

## 2019-06-19 DIAGNOSIS — R76.8 CYCLIC CITRULLINATED PEPTIDE (CCP) ANTIBODY POSITIVE: ICD-10-CM

## 2019-06-19 DIAGNOSIS — R76.8 POSITIVE DOUBLE STRANDED DNA ANTIBODY TEST: ICD-10-CM

## 2019-06-19 DIAGNOSIS — G89.29 CHRONIC BILATERAL THORACIC BACK PAIN: ICD-10-CM

## 2019-06-19 PROCEDURE — 99214 OFFICE O/P EST MOD 30 MIN: CPT | Performed by: INTERNAL MEDICINE

## 2019-06-26 DIAGNOSIS — D84.1 COMPLEMENT ABNORMALITY (HCC): ICD-10-CM

## 2019-06-26 LAB
C3 COMPLEMENT: 81.2 MG/DL (ref 90–180)
C4 COMPLEMENT: 11 MG/DL (ref 10–40)

## 2019-06-27 ENCOUNTER — HOSPITAL ENCOUNTER (OUTPATIENT)
Dept: WOMENS IMAGING | Age: 49
Discharge: HOME | End: 2019-06-27
Payer: COMMERCIAL

## 2019-06-27 ENCOUNTER — TELEPHONE (OUTPATIENT)
Dept: INTERNAL MEDICINE CLINIC | Age: 49
End: 2019-06-27

## 2019-06-27 DIAGNOSIS — Z12.39 SCREENING FOR BREAST CANCER: ICD-10-CM

## 2019-06-27 PROCEDURE — 77067 SCR MAMMO BI INCL CAD: CPT

## 2019-06-27 NOTE — TELEPHONE ENCOUNTER
Pt has an appt tomorrow w/the specialist. She was basing this appt on her lab results. She would like to know her results and if she still needed to keep appt.

## 2019-06-29 LAB — COMPLEMENT TOTAL (CH50): 51 CAE UNITS (ref 60–144)

## 2019-07-09 ENCOUNTER — OFFICE VISIT (OUTPATIENT)
Dept: FAMILY MEDICINE CLINIC | Age: 49
End: 2019-07-09
Payer: COMMERCIAL

## 2019-07-09 ENCOUNTER — TELEPHONE (OUTPATIENT)
Dept: FAMILY MEDICINE CLINIC | Age: 49
End: 2019-07-09

## 2019-07-09 VITALS
OXYGEN SATURATION: 99 % | WEIGHT: 153 LBS | SYSTOLIC BLOOD PRESSURE: 110 MMHG | DIASTOLIC BLOOD PRESSURE: 60 MMHG | HEART RATE: 84 BPM | BODY MASS INDEX: 21.42 KG/M2 | HEIGHT: 71 IN

## 2019-07-09 DIAGNOSIS — R51.9 CHRONIC NONINTRACTABLE HEADACHE, UNSPECIFIED HEADACHE TYPE: ICD-10-CM

## 2019-07-09 DIAGNOSIS — G89.29 CHRONIC NONINTRACTABLE HEADACHE, UNSPECIFIED HEADACHE TYPE: ICD-10-CM

## 2019-07-09 DIAGNOSIS — F41.1 GAD (GENERALIZED ANXIETY DISORDER): Primary | ICD-10-CM

## 2019-07-09 DIAGNOSIS — R20.2 PARESTHESIA OF UPPER AND LOWER EXTREMITIES OF BOTH SIDES: ICD-10-CM

## 2019-07-09 PROCEDURE — 99214 OFFICE O/P EST MOD 30 MIN: CPT | Performed by: INTERNAL MEDICINE

## 2019-07-09 RX ORDER — VENLAFAXINE HYDROCHLORIDE 37.5 MG/1
37.5 CAPSULE, EXTENDED RELEASE ORAL DAILY
Qty: 30 CAPSULE | Refills: 3 | Status: SHIPPED | OUTPATIENT
Start: 2019-07-09 | End: 2019-07-23
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
## 2019-07-09 NOTE — TELEPHONE ENCOUNTER
[facility] authorization dept calling to let you know that MRI was denied by insurance. States an appeal can be made to see if can get this service covered. Can fax to appeal dept at [phone]. Case number for this claim is [id number]. Need to be sure pt name (first and last) and  are on all pages. The girl who called wasn't sure exactly what information needed to be faxed. States she is going to call back later with specifics.

## 2019-07-10 ENCOUNTER — OFFICE VISIT (OUTPATIENT)
Dept: PSYCHOLOGY | Age: 49
End: 2019-07-10
Payer: COMMERCIAL

## 2019-07-10 DIAGNOSIS — F41.1 GENERALIZED ANXIETY DISORDER: Primary | ICD-10-CM

## 2019-07-10 PROCEDURE — 90791 PSYCH DIAGNOSTIC EVALUATION: CPT | Performed by: PSYCHOLOGIST

## 2019-07-10 ASSESSMENT — ANXIETY QUESTIONNAIRES
2. NOT BEING ABLE TO STOP OR CONTROL WORRYING: 3-NEARLY EVERY DAY
3. WORRYING TOO MUCH ABOUT DIFFERENT THINGS: 3-NEARLY EVERY DAY
GAD7 TOTAL SCORE: 16
7. FEELING AFRAID AS IF SOMETHING AWFUL MIGHT HAPPEN: 3-NEARLY EVERY DAY
4. TROUBLE RELAXING: 2-OVER HALF THE DAYS
6. BECOMING EASILY ANNOYED OR IRRITABLE: 1-SEVERAL DAYS
5. BEING SO RESTLESS THAT IT IS HARD TO SIT STILL: 1-SEVERAL DAYS
1. FEELING NERVOUS, ANXIOUS, OR ON EDGE: 3-NEARLY EVERY DAY

## 2019-07-10 ASSESSMENT — PATIENT HEALTH QUESTIONNAIRE - PHQ9
4. FEELING TIRED OR HAVING LITTLE ENERGY: 2
SUM OF ALL RESPONSES TO PHQ9 QUESTIONS 1 & 2: 4
7. TROUBLE CONCENTRATING ON THINGS, SUCH AS READING THE NEWSPAPER OR WATCHING TELEVISION: 2
SUM OF ALL RESPONSES TO PHQ QUESTIONS 1-9: 16
1. LITTLE INTEREST OR PLEASURE IN DOING THINGS: 2
6. FEELING BAD ABOUT YOURSELF - OR THAT YOU ARE A FAILURE OR HAVE LET YOURSELF OR YOUR FAMILY DOWN: 1
10. IF YOU CHECKED OFF ANY PROBLEMS, HOW DIFFICULT HAVE THESE PROBLEMS MADE IT FOR YOU TO DO YOUR WORK, TAKE CARE OF THINGS AT HOME, OR GET ALONG WITH OTHER PEOPLE: 2
3. TROUBLE FALLING OR STAYING ASLEEP: 3
9. THOUGHTS THAT YOU WOULD BE BETTER OFF DEAD, OR OF HURTING YOURSELF: 0
5. POOR APPETITE OR OVEREATING: 3
2. FEELING DOWN, DEPRESSED OR HOPELESS: 2
SUM OF ALL RESPONSES TO PHQ QUESTIONS 1-9: 16
8. MOVING OR SPEAKING SO SLOWLY THAT OTHER PEOPLE COULD HAVE NOTICED. OR THE OPPOSITE, BEING SO FIGETY OR RESTLESS THAT YOU HAVE BEEN MOVING AROUND A LOT MORE THAN USUAL: 1

## 2019-07-10 NOTE — PROGRESS NOTES
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 Social Needs    Financial resource strain: Not on file    Food insecurity:     Worry: Not on file     Inability: Not on file    Transportation needs:     Medical: Not on file     Non-medical: Not on file   Tobacco Use    Smoking status: Never Smoker    Smokeless tobacco: Never Used   Substance and Sexual Activity    Alcohol use: Yes     Comment: social    Drug use: No    Sexual activity: Yes     Partners: Male     Comment: Khalif   Lifestyle    Physical activity:     Days per week: Not on file     Minutes per session: Not on file    Stress: Not on file   Relationships    Social connections:     Talks on phone: Not on file     Gets together: Not on file     Attends Orthodoxy service: Not on file     Active member of club or organization: Not on file     Attends meetings of clubs or organizations: Not on file     Relationship status: Not on file    Intimate partner violence:     Fear of current or ex partner: Not on file     Emotionally abused: Not on file     Physically abused: Not on file     Forced sexual activity: Not on file   Other Topics Concern    Not on file   Social History Narrative    Not on file       TOBACCO:   reports that she has never smoked. She has never used smokeless tobacco.  ETOH:   reports that she drinks alcohol. Family History:   Family History   Problem Relation Age of Onset    Cancer Mother         skin benign    Other Mother         factor 5 +/bronciectisis    Heart Disease Father     Cancer Father         skin benign    Other Sister         cardiolipin +       A:    Pt presenting with chronic anxiety disorder in the VERONIKA with co-morbid chronic medical issues. There is still a lot of question about the chronic nature of her other medical issues, wondering if there is an autoimmune issue.  Much of our time communication, Trained in strategies for increasing balanced thinking, Discussed self-care (sleep, nutrition, rewarding activities, social support, exercise), Motivational Interviewing to determine importance and readiness for change, Discussed potential barriers to change, Established rapport, Conducted functional assessment and Supportive techniques    Pt Behavioral Change Plan:    1. Consider [facility] for individual psychotherapy: [facility]: [phone] or [phone], other office options: [address] or [address]  2. Schedule follow up with Dr Emily Rivera in 3-4 weeks to monitor Effexor 37.5 mg   3. Think about talking to boss about modifying work schedule over the next 1-2 months for stress reduction  4. Talk to  about pulling back in home life for rest and relaxation  5.  Return to clinic for Dr Torito Estrada in 2 weeks

## 2019-07-10 NOTE — Clinical Note
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 Going to meet her where she is at and support increasing awareness about impact stress can have on mind/body. Referral for longer term therapy given but not sure it is affordable. I'm going to bridge during her med trial venlafaxine. F/u with me in 2 weeks.  crystal

## 2019-07-15 ENCOUNTER — TELEPHONE (OUTPATIENT)
Dept: FAMILY MEDICINE CLINIC | Age: 49
End: 2019-07-15

## 2019-07-16 RX ORDER — TRAZODONE HYDROCHLORIDE 50 MG/1
50 TABLET ORAL NIGHTLY
Qty: 30 TABLET | Refills: 1 | Status: SHIPPED | OUTPATIENT
Start: 2019-07-16 | End: 2019-08-06 | Stop reason: SDUPTHER

## 2019-07-23 ENCOUNTER — TELEPHONE (OUTPATIENT)
Dept: FAMILY MEDICINE CLINIC | Age: 49
End: 2019-07-23

## 2019-07-23 ENCOUNTER — NURSE TRIAGE (OUTPATIENT)
Dept: OTHER | Facility: CLINIC | Age: 49
End: 2019-07-23

## 2019-07-23 ENCOUNTER — HOSPITAL ENCOUNTER (EMERGENCY)
Age: 49
Discharge: HOME | End: 2019-07-23
Attending: EMERGENCY MEDICINE
Payer: COMMERCIAL

## 2019-07-23 VITALS
TEMPERATURE: 97.4 F | HEIGHT: 71 IN | SYSTOLIC BLOOD PRESSURE: 132 MMHG | RESPIRATION RATE: 18 BRPM | OXYGEN SATURATION: 100 % | DIASTOLIC BLOOD PRESSURE: 77 MMHG | WEIGHT: 150 LBS | HEART RATE: 70 BPM | BODY MASS INDEX: 21 KG/M2

## 2019-07-23 DIAGNOSIS — R51.9 OCCIPITAL HEADACHE: Primary | ICD-10-CM

## 2019-07-23 PROCEDURE — 99283 EMERGENCY DEPT VISIT LOW MDM: CPT

## 2019-07-23 PROCEDURE — 2580000003 HC RX 258: Performed by: PHYSICIAN ASSISTANT

## 2019-07-23 PROCEDURE — 96375 TX/PRO/DX INJ NEW DRUG ADDON: CPT

## 2019-07-23 PROCEDURE — 6360000002 HC RX W HCPCS: Performed by: PHYSICIAN ASSISTANT

## 2019-07-23 PROCEDURE — 96374 THER/PROPH/DIAG INJ IV PUSH: CPT

## 2019-07-23 PROCEDURE — 96361 HYDRATE IV INFUSION ADD-ON: CPT

## 2019-07-23 RX ORDER — ESCITALOPRAM OXALATE 5 MG/1
5 TABLET ORAL DAILY
Qty: 90 TABLET | Refills: 1 | Status: SHIPPED | OUTPATIENT
Start: 2019-07-23 | End: 2019-08-15 | Stop reason: SDUPTHER

## 2019-07-23 RX ORDER — DEXAMETHASONE SODIUM PHOSPHATE 4 MG/ML
10 INJECTION, SOLUTION INTRA-ARTICULAR; INTRALESIONAL; INTRAMUSCULAR; INTRAVENOUS; SOFT TISSUE ONCE
Status: COMPLETED | OUTPATIENT
Start: 2019-07-23 | End: 2019-07-23

## 2019-07-23 RX ORDER — 0.9 % SODIUM CHLORIDE 0.9 %
1000 INTRAVENOUS SOLUTION INTRAVENOUS ONCE
Status: COMPLETED | OUTPATIENT
Start: 2019-07-23 | End: 2019-07-23

## 2019-07-23 RX ORDER — MAGNESIUM 200 MG
200 TABLET ORAL 2 TIMES DAILY
COMMUNITY
End: 2020-03-13

## 2019-07-23 RX ORDER — BUPIVACAINE HYDROCHLORIDE AND EPINEPHRINE 5; 5 MG/ML; UG/ML
10 INJECTION, SOLUTION PERINEURAL ONCE
Status: DISCONTINUED | OUTPATIENT
Start: 2019-07-23 | End: 2019-07-23

## 2019-07-23 RX ORDER — KETOROLAC TROMETHAMINE 30 MG/ML
15 INJECTION, SOLUTION INTRAMUSCULAR; INTRAVENOUS ONCE
Status: COMPLETED | OUTPATIENT
Start: 2019-07-23 | End: 2019-07-23

## 2019-07-23 RX ORDER — PROCHLORPERAZINE EDISYLATE 5 MG/ML
10 INJECTION INTRAMUSCULAR; INTRAVENOUS ONCE
Status: DISCONTINUED | OUTPATIENT
Start: 2019-07-23 | End: 2019-07-24 | Stop reason: HOSPADM

## 2019-07-23 RX ADMIN — DEXAMETHASONE SODIUM PHOSPHATE 10 MG: 4 INJECTION, SOLUTION INTRAMUSCULAR; INTRAVENOUS at 22:32

## 2019-07-23 RX ADMIN — KETOROLAC TROMETHAMINE 15 MG: 30 INJECTION, SOLUTION INTRAMUSCULAR at 21:52

## 2019-07-23 RX ADMIN — SODIUM CHLORIDE 1000 ML: 9 INJECTION, SOLUTION INTRAVENOUS at 21:51

## 2019-07-23 ASSESSMENT — PAIN DESCRIPTION - LOCATION: LOCATION: HEAD;NECK

## 2019-07-23 ASSESSMENT — PAIN DESCRIPTION - FREQUENCY: FREQUENCY: CONTINUOUS

## 2019-07-23 ASSESSMENT — PAIN SCALES - GENERAL
PAINLEVEL_OUTOF10: 7
PAINLEVEL_OUTOF10: 7

## 2019-07-23 ASSESSMENT — PAIN DESCRIPTION - PAIN TYPE: TYPE: CHRONIC PAIN

## 2019-07-23 ASSESSMENT — PAIN DESCRIPTION - DESCRIPTORS: DESCRIPTORS: BURNING

## 2019-07-23 NOTE — TELEPHONE ENCOUNTER
Pt called in wanting to speak to Dr. Coolidge Epley regarding her prescription for Effexor. She says she's been unable to sleep and its given her increased anxiety.      Please give pt a call back: [phone]

## 2019-07-23 NOTE — TELEPHONE ENCOUNTER
Reason for Disposition   Stiff neck (can't touch chin to chest)    Protocols used: HEADACHE-ADULT-AH    Patient is calling because she is having a 8/10 burning pressure sensation in her head that radiates down into her neck. Patient can not touch chin to chest all the way. No history of headaches or migraines noted. Patient able to speak clearly and able to answer questions appropriately.

## 2019-07-24 ENCOUNTER — OFFICE VISIT (OUTPATIENT)
Dept: PSYCHOLOGY | Age: 49
End: 2019-07-24
Payer: COMMERCIAL

## 2019-07-24 DIAGNOSIS — F41.1 GENERALIZED ANXIETY DISORDER: Primary | ICD-10-CM

## 2019-07-24 PROCEDURE — 90834 PSYTX W PT 45 MINUTES: CPT | Performed by: PSYCHOLOGIST

## 2019-07-24 ASSESSMENT — PATIENT HEALTH QUESTIONNAIRE - PHQ9
7. TROUBLE CONCENTRATING ON THINGS, SUCH AS READING THE NEWSPAPER OR WATCHING TELEVISION: 1
10. IF YOU CHECKED OFF ANY PROBLEMS, HOW DIFFICULT HAVE THESE PROBLEMS MADE IT FOR YOU TO DO YOUR WORK, TAKE CARE OF THINGS AT HOME, OR GET ALONG WITH OTHER PEOPLE: 1
5. POOR APPETITE OR OVEREATING: 2
2. FEELING DOWN, DEPRESSED OR HOPELESS: 2
4. FEELING TIRED OR HAVING LITTLE ENERGY: 2
SUM OF ALL RESPONSES TO PHQ9 QUESTIONS 1 & 2: 4
8. MOVING OR SPEAKING SO SLOWLY THAT OTHER PEOPLE COULD HAVE NOTICED. OR THE OPPOSITE, BEING SO FIGETY OR RESTLESS THAT YOU HAVE BEEN MOVING AROUND A LOT MORE THAN USUAL: 1
3. TROUBLE FALLING OR STAYING ASLEEP: 3
6. FEELING BAD ABOUT YOURSELF - OR THAT YOU ARE A FAILURE OR HAVE LET YOURSELF OR YOUR FAMILY DOWN: 0
SUM OF ALL RESPONSES TO PHQ QUESTIONS 1-9: 13
1. LITTLE INTEREST OR PLEASURE IN DOING THINGS: 2
SUM OF ALL RESPONSES TO PHQ QUESTIONS 1-9: 13
9. THOUGHTS THAT YOU WOULD BE BETTER OFF DEAD, OR OF HURTING YOURSELF: 0

## 2019-07-24 ASSESSMENT — ENCOUNTER SYMPTOMS
SHORTNESS OF BREATH: 0
RHINORRHEA: 0
ABDOMINAL PAIN: 0
VOMITING: 0
COUGH: 0
SORE THROAT: 0
NAUSEA: 0

## 2019-07-24 ASSESSMENT — ANXIETY QUESTIONNAIRES
GAD7 TOTAL SCORE: 12
3. WORRYING TOO MUCH ABOUT DIFFERENT THINGS: 1-SEVERAL DAYS
1. FEELING NERVOUS, ANXIOUS, OR ON EDGE: 2-OVER HALF THE DAYS
5. BEING SO RESTLESS THAT IT IS HARD TO SIT STILL: 1-SEVERAL DAYS
4. TROUBLE RELAXING: 2-OVER HALF THE DAYS
2. NOT BEING ABLE TO STOP OR CONTROL WORRYING: 2-OVER HALF THE DAYS
6. BECOMING EASILY ANNOYED OR IRRITABLE: 1-SEVERAL DAYS
7. FEELING AFRAID AS IF SOMETHING AWFUL MIGHT HAPPEN: 3-NEARLY EVERY DAY

## 2019-07-24 NOTE — ED PROVIDER NOTES
[facility] 71 ENCOUNTER          PHYSICIAN ASSISTANT NOTE       Date of evaluation: 7/23/2019    Chief Complaint     Headache      History of Present Illness     Daja Daugherty is a 57 y.o. female, with a history of anxiety, who presents to the ED with complaints of an occipital headache that started approximately 3 months ago that she describes a burning discomfort, relatively constant. She has had 2 days where she has been pain-free over the last few months. It is moderately controlled with over-the-counter medication. She denies dizziness, lightheadedness, nausea and vomiting. No visual disturbance. The patient states in September of last year she began experiencing \"shocklike\" pains in her bilateral neck and upper back followed by \"twitching\" of her body a few months later. She reports having had an EMG showing mild neuropathy. Had an unremarkable MRI of the brain. Cervical spine MRI showed mild degenerative spondylosis throughout the cervical spine, predominantly involving the facet joints, without high-grade cervical spinal canal or neural foraminal stenosis. Mild bilateral C4-C5 neural foraminal stenosis. The patient saw a neurologist in January and was recommended magnesium as well as a certain amount of carbs each day as she had been following a keto diet and this was thought to have been contributing. When she began having the occipital headaches, she followed up with her primary care provider who has ordered another MRI of her brain and thoracic spine which her insurance provider has yet to approve. She was referred to a different neurologist for a second opinion but was unable to get an appointment until October. Patient reports anxiety regarding her symptoms and wanted to come in for more prompt evaluation rather than waiting for her next neurologist appointment. Rates her headache is 7 out of 10 in severity, has not been able to identify any triggers. She has otherwise been well without fever, chills, neck pain or stiffness. No paresthesias, numbness or weakness in the extremities. She otherwise has no complaints. Review of Systems     Review of Systems   Constitutional: Negative for chills and fever. HENT: Negative for congestion, rhinorrhea and sore throat. Respiratory: Negative for cough and shortness of breath. Cardiovascular: Negative for chest pain and palpitations. Gastrointestinal: Negative for abdominal pain, nausea and vomiting. Genitourinary: Negative for decreased urine volume and difficulty urinating. Musculoskeletal: Negative for arthralgias, myalgias, neck pain and neck stiffness. Skin: Negative for rash. Neurological: Positive for headaches (occipital). Negative for dizziness, weakness, light-headedness and numbness. All other systems reviewed and are negative. Past Medical, Surgical, Family, and Social History     She has a past medical history of Acute headache, Collar bone fracture, VERONIKA (generalized anxiety disorder), Meningitis, and Tibia fracture. She has a past surgical history that includes Tonsillectomy and Dunlap tooth extraction. Her family history includes Cancer in her father and mother; Heart Disease in her father; Other in her mother and sister. She reports that she has never smoked. She has never used smokeless tobacco. She reports that she drinks alcohol. She reports that she does not use drugs. Medications     Previous Medications    ESCITALOPRAM (LEXAPRO) 5 MG TABLET    Take 1 tablet by mouth daily    MAGNESIUM 200 MG TABS TABLET    Take 400 mg by mouth 3 times daily    MULTIPLE VITAMIN (MULTI VITAMIN DAILY PO)    Take 1 tablet by mouth daily     OMEGA-3 FATTY ACIDS (FISH OIL CONCENTRATE PO)    1 teaspoon by mouth 3 times daily. TRAZODONE (DESYREL) 50 MG TABLET    Take 1 tablet by mouth nightly       Allergies     She has No Known Allergies.     Physical Exam     INITIAL VITALS: BP: 129/81, Temp: 97.4 °F (36.3 °C), Pulse: 70, Resp: 18, SpO2: 100 %  Physical Exam   Constitutional: She is oriented to person, place, and time. She appears well-developed and well-nourished. No distress. HENT:   Head: Normocephalic and atraumatic. Mouth/Throat: Mucous membranes are normal.   Eyes: Pupils are equal, round, and reactive to light. Conjunctivae and EOM are normal.   Neck: Normal range of motion and phonation normal. Neck supple. No Brudzinski's sign and no Kernig's sign noted. Cardiovascular: Normal rate and regular rhythm. Exam reveals no gallop and no friction rub. No murmur heard. Pulmonary/Chest: Effort normal. No respiratory distress. She has no wheezes. She has no rhonchi. She has no rales. Abdominal: Soft. She exhibits no distension. There is no tenderness. Neurological: She is alert and oriented to person, place, and time. She has normal strength. No cranial nerve deficit. Coordination normal.   Skin: Skin is warm, dry and intact. No lesion, no petechiae and no rash noted. Psychiatric: She has a normal mood and affect. Her behavior is normal.   Vitals reviewed. ED Course     Nursing Notes, Past Medical Hx,Past Surgical Hx, Social Hx, Allergies, and Family Hx were reviewed. The patient was given the following medications:  Orders Placed This Encounter   Medications    0.9 % sodium chloride bolus    ketorolac (TORADOL) injection 15 mg    DISCONTD: prochlorperazine (COMPAZINE) injection 10 mg    DISCONTD: bupivacaine-EPINEPHrine (MARCAINE-w/EPINEPHRINE) 0.5% -[id number] injection 10 mL    dexamethasone (DECADRON) injection 10 mg       CONSULTS:  None    MEDICAL DECISION MAKING / ASSESSMENT / Toñaabdirahman Chen is a 57 y.o. female presenting with a an occipital headache that has been persistent for the last several months. She is well-appearing, no neurologic deficits. IV access was established.   She was given IV fluids along with Toradol and Compazine after which she

## 2019-07-24 NOTE — PROGRESS NOTES
Interpretation of Total Score Depression Severity: 1-4 = Minimal depression, 5-9 = Mild depression, 10-14 = Moderate depression, 15-19 = Moderately severe depression, 20-27 = Severe depression    VERONIKA 7 SCORE 7/24/2019 7/10/2019   VERONIKA-7 Total Score 12 16     Interpretation of VERONIKA-7 score: 5-9 = mild anxiety, 10-14 = moderate anxiety, 15+ = severe anxiety. Recommend referral to behavioral health for scores 10 or greater. Denied any si/hi risk, intent, or plan     Diagnosis:    VERONIKA      Diagnosis Date    Acute headache     Collar bone fracture 1978    VERONIKA (generalized anxiety disorder)     Meningitis     Had as a child    Tibia fracture 1974     Problems with primary support group, Problems related to the social environment and Other psychosocial and environmental problems    Plan:  Pt interventions:    Practiced assertive communication, Trained in strategies for increasing balanced thinking, Discussed self-care (sleep, nutrition, rewarding activities, social support, exercise), Discussed benefits of referral for specialty care, Motivational Interviewing to increase patient confidence and compliance with adhering to behavioral change plan, Discussed potential barriers to change and Supportive techniques    Pt Behavioral Change Plan:    1. Call [facility] for individual psychotherapy: [facility]: [phone] or [phone], other office options: [facility] or [address] to find out wait time for longer term therapist  2. Start Lexapro 5 mg as prescribed per Dr Latia Kamara  3. Continue to give self space for accepting current physical limitations right now, slow down any amount  4. Go on vacation the first week August for fun and relaxation  5.  Return to clinic for Dr Cameron Padilla in 3 weeks

## 2019-07-24 NOTE — ED NOTES
Pt discharged from ED in stable, ambulatory condition. Discharge instructions explained, all questions answered. Pt walked to [facility] independently.        Alyssa Lei RN  07/23/19 5807

## 2019-08-01 ENCOUNTER — TELEPHONE (OUTPATIENT)
Dept: FAMILY MEDICINE CLINIC | Age: 49
End: 2019-08-01

## 2019-08-01 NOTE — TELEPHONE ENCOUNTER
Pt called in wanting to know if she can increase her dose of Trazadone. Pt says that the 50 mg she is on hasn't been helping much. If it can be increased pt would like for it to be sent to the [facility] on [address]. Best call back number: [phone].

## 2019-08-06 ENCOUNTER — TELEPHONE (OUTPATIENT)
Dept: FAMILY MEDICINE CLINIC | Age: 49
End: 2019-08-06

## 2019-08-06 RX ORDER — TRAZODONE HYDROCHLORIDE 100 MG/1
100 TABLET ORAL NIGHTLY
Qty: 30 TABLET | Refills: 5 | Status: SHIPPED | OUTPATIENT
Start: 2019-08-06 | End: 2019-08-15

## 2019-08-06 NOTE — TELEPHONE ENCOUNTER
pls clarify - she had called saying that the 50 mg was not helping and I advised her to increase to two tablets - so is it the 1 tablet of 50 mg or is she taking 2 tablets that is helping?  Either is fine, just want to make sure I sent the right Rx

## 2019-08-14 ENCOUNTER — OFFICE VISIT (OUTPATIENT)
Dept: PSYCHOLOGY | Age: 49
End: 2019-08-14
Payer: COMMERCIAL

## 2019-08-14 DIAGNOSIS — F41.1 GENERALIZED ANXIETY DISORDER: Primary | ICD-10-CM

## 2019-08-14 DIAGNOSIS — F33.0 MDD (MAJOR DEPRESSIVE DISORDER), RECURRENT EPISODE, MILD (HCC): ICD-10-CM

## 2019-08-14 PROCEDURE — 90834 PSYTX W PT 45 MINUTES: CPT | Performed by: PSYCHOLOGIST

## 2019-08-15 ENCOUNTER — OFFICE VISIT (OUTPATIENT)
Dept: FAMILY MEDICINE CLINIC | Age: 49
End: 2019-08-15
Payer: COMMERCIAL

## 2019-08-15 ENCOUNTER — TELEPHONE (OUTPATIENT)
Dept: INTERNAL MEDICINE CLINIC | Age: 49
End: 2019-08-15

## 2019-08-15 VITALS
WEIGHT: 147.8 LBS | DIASTOLIC BLOOD PRESSURE: 71 MMHG | HEART RATE: 66 BPM | HEIGHT: 71 IN | BODY MASS INDEX: 20.69 KG/M2 | OXYGEN SATURATION: 98 % | SYSTOLIC BLOOD PRESSURE: 114 MMHG

## 2019-08-15 DIAGNOSIS — F51.01 PRIMARY INSOMNIA: ICD-10-CM

## 2019-08-15 DIAGNOSIS — F41.1 GAD (GENERALIZED ANXIETY DISORDER): Primary | ICD-10-CM

## 2019-08-15 PROCEDURE — 99213 OFFICE O/P EST LOW 20 MIN: CPT | Performed by: INTERNAL MEDICINE

## 2019-08-15 RX ORDER — HYDROXYZINE HYDROCHLORIDE 25 MG/1
25 TABLET, FILM COATED ORAL NIGHTLY
Qty: 30 TABLET | Refills: 5 | Status: SHIPPED | OUTPATIENT
Start: 2019-08-15 | End: 2019-09-05 | Stop reason: ALTCHOICE

## 2019-08-15 RX ORDER — AMITRIPTYLINE HYDROCHLORIDE 10 MG/1
10 TABLET, FILM COATED ORAL NIGHTLY
Qty: 60 TABLET | Refills: 0 | Status: SHIPPED | OUTPATIENT
Start: 2019-08-15 | End: 2019-09-05 | Stop reason: ALTCHOICE

## 2019-08-15 RX ORDER — ESCITALOPRAM OXALATE 10 MG/1
10 TABLET ORAL DAILY
Qty: 30 TABLET | Refills: 1 | Status: SHIPPED | OUTPATIENT
Start: 2019-08-15 | End: 2019-09-05 | Stop reason: SDUPTHER

## 2019-08-15 NOTE — PROGRESS NOTES
8/15/2019     Daja Murillo (:  1970) is a 54 y.o. female, here for evaluation of the following medical concerns:    HPI  She presents for follow-up of her anxiety and her insomnia. She has not had any major adverse effects from the Lexapro but wonders if it is interrupting her sleeping at all. She does not see any clear benefit from the medication thus far however. She had difficulty with taking Effexor. No worsening depression, SI, HI. She is still having the tingling sensations in her body. She has seen a functional medicine doctor as well as a neurologist and has an upcoming MRI. No new neurologic symptoms. She continues to suffer with poor sleep. She has difficulty falling and staying asleep. She initially thought the trazodone was helpful but no longer find it effective. She is interested in trying another medication. Does have frequent headaches but they are not migrainous in nature. They are more of a pressure sensation. Review of Systems   Neurological: Positive for numbness and headaches. Psychiatric/Behavioral: Positive for sleep disturbance. Negative for dysphoric mood, self-injury and suicidal ideas. The patient is nervous/anxious. Prior to Visit Medications    Medication Sig Taking? Authorizing Provider   amitriptyline (ELAVIL) 10 MG tablet Take 1 tablet by mouth nightly Take 1-2 tablets nightly prn Yes Hayden Angulo MD   escitalopram (LEXAPRO) 10 MG tablet Take 1 tablet by mouth daily Yes Hayden Angulo MD   hydrOXYzine (ATARAX) 25 MG tablet Take 1 tablet by mouth nightly Yes Hayden Angulo MD   Omega-3 Fatty Acids (FISH OIL CONCENTRATE PO) 1 teaspoon by mouth 3 times daily.  Yes Historical Provider, MD   magnesium 200 MG TABS tablet Take 200 mg by mouth 2 times daily Indications: patient is taking 200 mg bid  Yes Historical Provider, MD   Multiple Vitamin (MULTI VITAMIN DAILY PO) Take 1 tablet by mouth daily  Yes Historical Provider, MD Social History     Tobacco Use    Smoking status: Never Smoker    Smokeless tobacco: Never Used   Substance Use Topics    Alcohol use: Yes     Comment: social        Vitals:    08/15/19 0759   BP: 114/71   Pulse: 66   SpO2: 98%   Weight: 147 lb 12.8 oz (67 kg)   Height: 5' 10.8\" (1.798 m)     Estimated body mass index is 20.73 kg/m² as calculated from the following:    Height as of this encounter: 5' 10.8\" (1.798 m). Weight as of this encounter: 147 lb 12.8 oz (67 kg). Physical Exam   Constitutional: She appears well-developed and well-nourished. No distress. Psychiatric: She has a normal mood and affect. Her behavior is normal. Judgment and thought content normal.       ASSESSMENT/PLAN:  1. VERONIKA (generalized anxiety disorder)  We discussed treatment options. We decided to increase her Lexapro. She will notify me if this worsens her sleep problems. We will also do a gene site test that she has had some difficulty taking these medications. We will follow-up scheduled to monitor her response to treatment. 2. Primary insomnia  Recommend trial of amitriptyline. This may help her sleep as well as provide some extra benefit for her anxiety. It may also potentially help her frequent headaches although they are not migraines as well as her neurologic symptoms of unclear etiology at this time. She will also try the hydroxyzine at night if needed      Return in about 4 weeks (around 9/12/2019). An electronic signature was used to authenticate this note.     --Allison Higginbotham MD on 8/15/2019 at 12:33 PM

## 2019-08-15 NOTE — PATIENT INSTRUCTIONS
1. [facility] to consider [facility]: [phone], other office options: [facility] or [place] to find out wait time for longer term therapist. Consider mindfulness based cognitive therapy approach  2. Continue with Lexapro 10 mg as prescribed per Dr Waleska Palumbo, taking 2 50 mg tablets trazodone with melatonin (per Dr Raul Raymond: functional medicine) which seems to have helped her last night  3. Continue to give self space for accepting current physical limitations right now, slow down any amount  4. Practice diaphragmatic breathing exercises, prayer at night time when anxiety is the worst  5.  Return to clinic for Dr Nav Garcia in 1-2 weeks

## 2019-08-15 NOTE — TELEPHONE ENCOUNTER
amitriptyline (ELAVIL) 10 MG tablet     [facility] called they wanted clarification in directions of this medication. She is to take 1-2 tabs nightly PRN.

## 2019-09-03 ENCOUNTER — TELEPHONE (OUTPATIENT)
Dept: FAMILY MEDICINE CLINIC | Age: 49
End: 2019-09-03

## 2019-09-03 RX ORDER — TRAZODONE HYDROCHLORIDE 100 MG/1
150 TABLET ORAL NIGHTLY PRN
Qty: 30 TABLET | Refills: 5 | Status: SHIPPED | OUTPATIENT
Start: 2019-09-03 | End: 2019-09-05 | Stop reason: SDUPTHER

## 2019-09-05 ENCOUNTER — OFFICE VISIT (OUTPATIENT)
Dept: FAMILY MEDICINE CLINIC | Age: 49
End: 2019-09-05
Payer: COMMERCIAL

## 2019-09-05 VITALS
WEIGHT: 149.4 LBS | SYSTOLIC BLOOD PRESSURE: 102 MMHG | RESPIRATION RATE: 16 BRPM | OXYGEN SATURATION: 99 % | DIASTOLIC BLOOD PRESSURE: 64 MMHG | HEART RATE: 60 BPM | BODY MASS INDEX: 20.95 KG/M2

## 2019-09-05 DIAGNOSIS — R20.2 PARESTHESIA: ICD-10-CM

## 2019-09-05 DIAGNOSIS — F41.1 GAD (GENERALIZED ANXIETY DISORDER): Primary | ICD-10-CM

## 2019-09-05 DIAGNOSIS — F51.01 PRIMARY INSOMNIA: ICD-10-CM

## 2019-09-05 PROBLEM — M41.114 JUVENILE IDIOPATHIC SCOLIOSIS OF THORACIC REGION: Status: RESOLVED | Noted: 2018-09-20 | Resolved: 2019-09-05

## 2019-09-05 PROBLEM — R51.9 ACUTE HEADACHE: Status: RESOLVED | Noted: 2019-09-05 | Resolved: 2019-09-05

## 2019-09-05 PROCEDURE — 99214 OFFICE O/P EST MOD 30 MIN: CPT | Performed by: INTERNAL MEDICINE

## 2019-09-05 RX ORDER — ESCITALOPRAM OXALATE 10 MG/1
15 TABLET ORAL DAILY
Qty: 30 TABLET | Refills: 5 | Status: SHIPPED | OUTPATIENT
Start: 2019-09-05 | End: 2019-09-24 | Stop reason: SDUPTHER

## 2019-09-05 RX ORDER — TRAZODONE HYDROCHLORIDE 150 MG/1
150 TABLET ORAL NIGHTLY PRN
Qty: 30 TABLET | Refills: 5 | Status: SHIPPED | OUTPATIENT
Start: 2019-09-05 | End: 2019-11-07

## 2019-09-10 ENCOUNTER — PATIENT MESSAGE (OUTPATIENT)
Dept: FAMILY MEDICINE CLINIC | Age: 49
End: 2019-09-10

## 2019-09-10 RX ORDER — DESVENLAFAXINE 25 MG/1
25 TABLET, EXTENDED RELEASE ORAL DAILY
Qty: 30 TABLET | Refills: 3 | Status: SHIPPED | OUTPATIENT
Start: 2019-09-10 | End: 2020-03-13

## 2019-09-24 RX ORDER — ESCITALOPRAM OXALATE 10 MG/1
15 TABLET ORAL DAILY
Qty: 30 TABLET | Refills: 5 | Status: SHIPPED | OUTPATIENT
Start: 2019-09-24 | End: 2020-02-03 | Stop reason: SDUPTHER

## 2019-10-22 ENCOUNTER — OFFICE VISIT (OUTPATIENT)
Dept: FAMILY MEDICINE CLINIC | Age: 49
End: 2019-10-22
Payer: COMMERCIAL

## 2019-10-22 VITALS
BODY MASS INDEX: 22.16 KG/M2 | RESPIRATION RATE: 16 BRPM | WEIGHT: 158 LBS | HEART RATE: 66 BPM | DIASTOLIC BLOOD PRESSURE: 66 MMHG | OXYGEN SATURATION: 98 % | SYSTOLIC BLOOD PRESSURE: 106 MMHG

## 2019-10-22 DIAGNOSIS — J02.9 SORE THROAT: Primary | ICD-10-CM

## 2019-10-22 LAB — S PYO AG THROAT QL: NORMAL

## 2019-10-22 PROCEDURE — 99213 OFFICE O/P EST LOW 20 MIN: CPT | Performed by: INTERNAL MEDICINE

## 2019-10-22 PROCEDURE — 87880 STREP A ASSAY W/OPTIC: CPT | Performed by: INTERNAL MEDICINE

## 2019-10-22 RX ORDER — AMOXICILLIN 500 MG/1
500 CAPSULE ORAL 2 TIMES DAILY
Qty: 20 CAPSULE | Refills: 0 | Status: SHIPPED | OUTPATIENT
Start: 2019-10-22 | End: 2019-11-01

## 2019-10-22 ASSESSMENT — ENCOUNTER SYMPTOMS
NAUSEA: 0
EYE DISCHARGE: 0
ABDOMINAL PAIN: 0
DIARRHEA: 0
COUGH: 0
SINUS PRESSURE: 0
EYE REDNESS: 0
SINUS PAIN: 0
WHEEZING: 0
VOMITING: 0
SHORTNESS OF BREATH: 0
RHINORRHEA: 0
SORE THROAT: 1

## 2019-10-24 LAB — THROAT CULTURE: NORMAL

## 2019-11-06 ENCOUNTER — TELEPHONE (OUTPATIENT)
Dept: FAMILY MEDICINE CLINIC | Age: 49
End: 2019-11-06

## 2019-11-06 NOTE — TELEPHONE ENCOUNTER
Pt called in regarding a message from Jayashree Gomez earlier today. I did not see what the call was in regards to so I told pt I would send a message back and have Ally reach out again tomorrow.

## 2019-11-07 ENCOUNTER — TELEPHONE (OUTPATIENT)
Dept: FAMILY MEDICINE CLINIC | Age: 49
End: 2019-11-07

## 2019-11-07 RX ORDER — TRAZODONE HYDROCHLORIDE 50 MG/1
50 TABLET ORAL NIGHTLY PRN
Qty: 30 TABLET | Refills: 5 | Status: SHIPPED | OUTPATIENT
Start: 2019-11-07 | End: 2020-10-14 | Stop reason: SDUPTHER

## 2019-11-07 NOTE — TELEPHONE ENCOUNTER
Pt called in regarding message from Ken. Pt says that she got the MRI done at [facility] and the results were unremarkable (MRI was done with and without contrast and she says that she will be bring the results in the next time she's in). Pt also wanted to know what blood work needed to be done as well since that was in the message as well.      Best call back number: [phone]

## 2019-12-30 ENCOUNTER — PATIENT MESSAGE (OUTPATIENT)
Dept: FAMILY MEDICINE CLINIC | Age: 49
End: 2019-12-30

## 2019-12-30 DIAGNOSIS — F51.01 PRIMARY INSOMNIA: Primary | ICD-10-CM

## 2019-12-30 RX ORDER — ESZOPICLONE 2 MG/1
2 TABLET, FILM COATED ORAL NIGHTLY
Qty: 30 TABLET | Refills: 1 | Status: SHIPPED | OUTPATIENT
Start: 2019-12-30 | End: 2020-03-13

## 2020-03-12 ENCOUNTER — TELEPHONE (OUTPATIENT)
Dept: FAMILY MEDICINE CLINIC | Age: 50
End: 2020-03-12

## 2020-03-12 NOTE — TELEPHONE ENCOUNTER
PT called in stating that both of her children were recently diagnosed with strep and now she's been experiencing a sore throat, congestion and over all achiness. Offered PT Dr. Bridgette Baca 2 pm opening today but PT was unable to come in. She would like to know if there is someone who can see her tomorrow.      Please call PT back: [phone]

## 2020-03-13 ENCOUNTER — OFFICE VISIT (OUTPATIENT)
Dept: FAMILY MEDICINE CLINIC | Age: 50
End: 2020-03-13
Payer: COMMERCIAL

## 2020-03-13 VITALS
BODY MASS INDEX: 21.56 KG/M2 | OXYGEN SATURATION: 99 % | DIASTOLIC BLOOD PRESSURE: 64 MMHG | HEART RATE: 69 BPM | WEIGHT: 154 LBS | HEIGHT: 71 IN | RESPIRATION RATE: 15 BRPM | SYSTOLIC BLOOD PRESSURE: 116 MMHG

## 2020-03-13 LAB — STREPTOCOCCUS A RNA: NEGATIVE

## 2020-03-13 PROCEDURE — 87651 STREP A DNA AMP PROBE: CPT | Performed by: FAMILY MEDICINE

## 2020-03-13 PROCEDURE — 99213 OFFICE O/P EST LOW 20 MIN: CPT | Performed by: FAMILY MEDICINE

## 2020-03-13 RX ORDER — CEPHALEXIN 500 MG/1
500 CAPSULE ORAL 2 TIMES DAILY
Qty: 20 CAPSULE | Refills: 0 | Status: SHIPPED | OUTPATIENT
Start: 2020-03-13 | End: 2020-10-14

## 2020-03-13 RX ORDER — TRAZODONE HYDROCHLORIDE 50 MG/1
TABLET ORAL
COMMUNITY
Start: 2020-03-03 | End: 2020-07-13

## 2020-03-13 ASSESSMENT — PATIENT HEALTH QUESTIONNAIRE - PHQ9
SUM OF ALL RESPONSES TO PHQ QUESTIONS 1-9: 0
1. LITTLE INTEREST OR PLEASURE IN DOING THINGS: 0
2. FEELING DOWN, DEPRESSED OR HOPELESS: 0
SUM OF ALL RESPONSES TO PHQ QUESTIONS 1-9: 0
SUM OF ALL RESPONSES TO PHQ9 QUESTIONS 1 & 2: 0

## 2020-03-13 NOTE — PROGRESS NOTES
PROGRESS NOTE     Jose Francisco Daugherty MD  [facility] [place] [facility]  [phone] office  [phone] fax    Date of Service:  3/13/2020    Subjective:      Patient ID: Hima Valdez is a 54 y.o. female      CC: acute illness    HPI    82-year-old white female presenting with 4 days of sore throat, headache, nasal congestion, myalgias, and mild cough. She has not taken her temperature, but does not believe she is had any fevers. She denies chills and sweats. She denies neck pain, chest pain, shortness of breath, sinus pain or pressure, earache, or any GI symptoms. she has 2 kids with strep throat over the last 7 days. Vitals:    03/13/20 1004   BP: 116/64   Site: Right Upper Arm   Position: Sitting   Cuff Size: Medium Adult   Pulse: 69   Resp: 15   SpO2: 99%   Weight: 154 lb (69.9 kg)   Height: 5' 11\" (1.803 m)       Outpatient Medications Marked as Taking for the 3/13/20 encounter (Office Visit) with Jordan Chung MD   Medication Sig Dispense Refill    cephALEXin (KEFLEX) 500 MG capsule Take 1 capsule by mouth 2 times daily 20 capsule 0    Omega-3 Fatty Acids (FISH OIL CONCENTRATE PO) 1 teaspoon by mouth 3 times daily.       Multiple Vitamin (MULTI VITAMIN DAILY PO) Take 1 tablet by mouth daily          Past Medical History:   Diagnosis Date    Acute headache     Collar bone fracture 1978    VERONIKA (generalized anxiety disorder)     Juvenile idiopathic scoliosis of thoracic region 9/20/2018    Meningitis     Had as a child    Tibia fracture 1974       Past Surgical History:   Procedure Laterality Date    TONSILLECTOMY      WISDOM TOOTH EXTRACTION         Social History     Tobacco Use    Smoking status: Never Smoker    Smokeless tobacco: Never Used   Substance Use Topics    Alcohol use: Yes     Comment: social       Family History   Problem Relation Age of Onset    Cancer Mother         skin benign    Other Mother         factor 11

## 2020-03-15 LAB — THROAT CULTURE: NORMAL

## 2020-03-27 ENCOUNTER — NURSE TRIAGE (OUTPATIENT)
Dept: OTHER | Facility: CLINIC | Age: 50
End: 2020-03-27

## 2020-03-27 NOTE — TELEPHONE ENCOUNTER
Reason for Disposition   Allergy symptoms are also present (e.g., itchy eyes, clear nasal discharge, postnasal drip)    Answer Assessment - Initial Assessment Questions  1. ONSET: \"When did the cough begin? \"       Past sunday  2. SEVERITY: \"How bad is the cough today? \"       Not much today, but it is a little barky, sputum yellow to green  3. RESPIRATORY DISTRESS: \"Describe your breathing. \"       denies  4. FEVER: \"Do you have a fever? \" If so, ask: \"What is your temperature, how was it measured, and when did it start? \"      no  5. HEMOPTYSIS: \"Are you coughing up any blood? \" If so ask: \"How much? \" (flecks, streaks, tablespoons, etc.)      no  6. TREATMENT: \"What have you done so far to treat the cough? \" (e.g., meds, fluids, humidifier)      no  7. CARDIAC HISTORY: \"Do you have any history of heart disease? \" (e.g., heart attack, congestive heart failure)       no  8. LUNG HISTORY: \"Do you have any history of lung disease? \"  (e.g., pulmonary embolus, asthma, emphysema)      no  9. PE RISK FACTORS: \"Do you have a history of blood clots? \" (or: recent major surgery, recent prolonged travel, bedridden)      no  10. OTHER SYMPTOMS: \"Do you have any other symptoms? (e.g., runny nose, wheezing, chest pain)       See \"reason for call\". She does drink and urinate    11. PREGNANCY: \"Is there any chance you are pregnant? \" \"When was your last menstrual period? \"        no  12. TRAVEL: \"Have you traveled out of the country in the last month? \" (e.g., travel history, exposures)        no    Protocols used: COUGH-ADULT-[place]    Caller reports symptoms as documented above. Caller informed of disposition. Caller educated on [facility]/web site and/or [employer]. Care advice as documented. Pt states she has been off work for these sx - pt informed that she needs to go through [employer] to know when to return to work - phone number provided. Please do not respond to the triage nurse through this encounter.   Any subsequent communication should be directly with the patient.

## 2020-04-27 RX ORDER — DESVENLAFAXINE 25 MG/1
25 TABLET, EXTENDED RELEASE ORAL DAILY
Qty: 30 TABLET | Refills: 3 | Status: SHIPPED | OUTPATIENT
Start: 2020-04-27 | End: 2020-07-06

## 2020-07-06 RX ORDER — DESVENLAFAXINE 50 MG/1
50 TABLET, EXTENDED RELEASE ORAL DAILY
Qty: 30 TABLET | Refills: 3 | Status: SHIPPED | OUTPATIENT
Start: 2020-07-06 | End: 2020-10-14 | Stop reason: DRUGHIGH

## 2020-07-13 RX ORDER — TRAZODONE HYDROCHLORIDE 50 MG/1
TABLET ORAL
Qty: 30 TABLET | Refills: 4 | Status: SHIPPED | OUTPATIENT
Start: 2020-07-13 | End: 2021-02-15

## 2020-10-14 ENCOUNTER — OFFICE VISIT (OUTPATIENT)
Dept: FAMILY MEDICINE CLINIC | Age: 50
End: 2020-10-14
Payer: COMMERCIAL

## 2020-10-14 VITALS
SYSTOLIC BLOOD PRESSURE: 116 MMHG | WEIGHT: 163 LBS | RESPIRATION RATE: 16 BRPM | DIASTOLIC BLOOD PRESSURE: 68 MMHG | OXYGEN SATURATION: 97 % | HEART RATE: 66 BPM | BODY MASS INDEX: 22.73 KG/M2

## 2020-10-14 PROCEDURE — 99213 OFFICE O/P EST LOW 20 MIN: CPT | Performed by: INTERNAL MEDICINE

## 2020-10-14 RX ORDER — DESVENLAFAXINE 25 MG/1
25 TABLET, EXTENDED RELEASE ORAL DAILY
Qty: 90 TABLET | Refills: 3 | Status: SHIPPED
Start: 2020-10-14 | End: 2021-05-25 | Stop reason: SINTOL

## 2020-10-14 NOTE — PROGRESS NOTES
10/14/2020     Daja Murillo (:  1970) is a 52 y.o. female, here for evaluation of the following medical concerns:    HPI  Patient presents for f/u of anxiety and insomnia. She had previously requested to increase her Pristiq, however, she feels that it has made her more jittery and forgetful at times. She would like to decrease her dose. She feels her anxiety is much improved. Previous physical symptoms that she was experiencing have resolved other than an occasional headache. She is following with a therapist every few weeks and finds this to be a good fit. She is sleeping well with the trazodone, no adverse effects. She will have her flu shot at work. He would like to schedule her mammogram however. Her Pap is up-to-date. Review of Systems   Psychiatric/Behavioral: Negative for decreased concentration, dysphoric mood, sleep disturbance and suicidal ideas. The patient is not nervous/anxious. Prior to Visit Medications    Medication Sig Taking? Authorizing Provider   desvenlafaxine succinate (PRISTIQ) 25 MG TB24 extended release tablet Take 1 tablet by mouth daily Yes Lauri Mojica MD   traZODone (DESYREL) 50 MG tablet TAKE ONE TABLET BY MOUTH NIGHTLY AS NEEDED FOR SLEEP Yes Lauri Mojica MD   Omega-3 Fatty Acids (FISH OIL CONCENTRATE PO) 1 teaspoon by mouth 3 times daily. Yes Historical Provider, MD   Multiple Vitamin (MULTI VITAMIN DAILY PO) Take 1 tablet by mouth daily  Yes Historical Provider, MD        Social History     Tobacco Use    Smoking status: Never Smoker    Smokeless tobacco: Never Used   Substance Use Topics    Alcohol use: Yes     Comment: social        Vitals:    10/14/20 0912   BP: 116/68   Pulse: 66   Resp: 16   SpO2: 97%   Weight: 163 lb (73.9 kg)     Estimated body mass index is 22.73 kg/m² as calculated from the following:    Height as of 3/13/20: 5' 11\" (1.803 m). Weight as of this encounter: 163 lb (73.9 kg).     Physical Exam  Vitals signs reviewed. Constitutional:       Appearance: Normal appearance. HENT:      Head: Normocephalic and atraumatic. Neurological:      General: No focal deficit present. Mental Status: She is alert. Mental status is at baseline. Psychiatric:         Mood and Affect: Mood normal.         Behavior: Behavior normal.         Thought Content: Thought content normal.         Judgment: Judgment normal.         ASSESSMENT/PLAN:  1. Primary insomnia  Continue trazodone, no adverse effects. 2. VERONIKA (generalized anxiety disorder)  We will decrease her Pristiq. She will notify me if the perceived side effects do not improve. She will continue with her therapist regularly    3. Encounter for screening mammogram for malignant neoplasm of breast    - FARHAD DIGITAL SCREEN W OR WO CAD BILATERAL; Future      Return in about 6 months (around 4/14/2021). An electronic signature was used to authenticate this note.     --Mauricio Mina MD on 10/14/2020 at 9:34 AM

## 2020-10-21 ENCOUNTER — OFFICE VISIT (OUTPATIENT)
Dept: DERMATOLOGY | Age: 50
End: 2020-10-21
Payer: COMMERCIAL

## 2020-10-21 VITALS — TEMPERATURE: 97.3 F

## 2020-10-21 PROCEDURE — 11102 TANGNTL BX SKIN SINGLE LES: CPT | Performed by: DERMATOLOGY

## 2020-10-21 PROCEDURE — 99213 OFFICE O/P EST LOW 20 MIN: CPT | Performed by: DERMATOLOGY

## 2020-10-21 NOTE — PROGRESS NOTES
[facility]) Dermatology  Mark Medina M.D.  [phone]       Mattie Tang  1970    52 y.o. female     Date of Visit: 10/21/2020    Chief Complaint:   Chief Complaint   Patient presents with    Skin Lesion        I was asked to see this patient by Dr. Lopez ref. provider found. History of Present Illness:  1. TBSE    Multiple nevi-stable in size, shape, color. Has not noticed any new or changing pigmented lesions. Does monitor her skin for change. Does wear hats and sunscreen    4 children ages 6-7. Works as a physical therapist at [employer] history:  + hats/ SPF. No prior h/o tanning bed     No PH skin cancer  1/16 right medial breast dysplastic nevus with mild dysplasia  2/19 right mid paraspinal back dysplastic nevus with mild dysplasia completely excised     + mother / father with NMSC    Review of Systems:  Constitutional: Reports general sense of well-being       Past Medical History, Surgical History, Family History, Medications and Allergies reviewed. 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    Social Needs    Financial resource strain: Not on file    Food insecurity     Worry: Not on file     Inability: Not on file    Transportation needs     Medical: Not on file     Non-medical: Not on file   Tobacco Use    Smoking status: Never Smoker    Smokeless tobacco: Never Used   Substance and Sexual Activity    Alcohol use: Yes     Comment: social    Drug use: No    Sexual activity: Yes     Partners: Male     Comment: Khalif   Lifestyle    Physical activity     Days per week: Not on file     Minutes per session: Not on file    Stress: Not on file   Relationships    Social connections     Talks on phone: Not on file     Gets together: Not on file     Attends Anglican service: Not on file     Active member of club or organization: Not on file     Attends meetings of clubs or organizations: Not on file     Relationship status: Not on file    Intimate partner violence     Fear of current or ex partner: Not on file     Emotionally abused: Not on file     Physically abused: Not on file     Forced sexual activity: Not on file   Other Topics Concern    Not on file   Social History Narrative    Not on file       Physical Examination       -General: Well-appearing, NAD  1. Normal affect. Total body skin exam including scalp, face, neck, chest, abdomen, back, bilateral upper extremities, bilateral lower extremities, ocular conjunctiva, external lips, and nails was performed. Examination normal unless stated below. Underwear area not examined. Scattered on the trunk and extremities are multiple well-defined round and oval symmetric smoothly-bordered uniformly brown macules and papules. Well-healed scars at sites of prior dysplastic nevus no sign of recurrent    Left lower back 4 mm brown nevus with 2 mm dark brown papule at 1 margin rule out atypia              Assessment and Plan     1. Neoplasm of uncertain behavior of skin-left lower back--Discussed possible diagnosis. Patient agreeable to biopsy. Verbal consent obtained after risks (infection, bleeding, scar), benefits and alternatives explained. -Area(s) to be biopsied were marked with a surgical pen. Site(s) were cleansed with alcohol. Local anesthesia achieved with 1% lidocaine with epinephrine/sodium bicarbonate. Shave biopsy performed with a razor blade. Hemostasis was achieved with aluminum chloride. The wound(s) were dressed with petrolatum and covered with a bandage. Specimen(s) sent to pathology. Pt educated re: risk of bleeding, infection, scar and wound care instructions.      2. Benign nevus - Benign acquired melanocytic nevi  -Recommend monthly self skin exams   -Educated regarding the ABCDEs of melanoma detection   -Call for any new/changing moles or concerning lesions  -Reviewed sun protective behavior -- sun avoidance during the peak hours of the day, sun-protective clothing (including hat and sunglasses), sunscreen use (water resistant, broad spectrum, SPF at least 30, need for reapplication every 2 to 3 hours), avoidance of tanning beds      3.  History of dysplastic nevus-monitor for new or changing pigmented lesion

## 2020-10-28 LAB — DERMATOLOGY PATHOLOGY REPORT: NORMAL

## 2020-11-10 ENCOUNTER — TELEPHONE (OUTPATIENT)
Dept: FAMILY MEDICINE CLINIC | Age: 50
End: 2020-11-10

## 2020-11-10 NOTE — TELEPHONE ENCOUNTER
LVM for pt regarding mammo order place in October.  Please offer Maryjane Moeller or the number to call and schedule [phone]

## 2021-02-15 RX ORDER — TRAZODONE HYDROCHLORIDE 50 MG/1
TABLET ORAL
Qty: 90 TABLET | Refills: 3 | Status: SHIPPED | OUTPATIENT
Start: 2021-02-15 | End: 2021-04-22

## 2021-04-05 ENCOUNTER — TELEPHONE (OUTPATIENT)
Dept: FAMILY MEDICINE CLINIC | Age: 51
End: 2021-04-05

## 2021-04-05 NOTE — TELEPHONE ENCOUNTER
PT called in regarding message from [facility]. PT will be in @ 11:40 am on Thursday.

## 2021-04-05 NOTE — TELEPHONE ENCOUNTER
----- Message from [facility] sent at 4/5/2021  2:00 PM EDT -----  Subject: Appointment Request    Reason for Call: Urgent Mental Health    QUESTIONS  Type of Appointment? Established Patient  Reason for appointment request? No appointments available during search  Additional Information for Provider? Patient needs an urgent appt for   anxiety   she screened green. call pt to schedule an appt for this week. (thursday   or friday)  ---------------------------------------------------------------------------  --------------  Stephani BRIGHT  What is the best way for the office to contact you? OK to leave message on   voicemail  Preferred Call Back Phone Number? [phone]  ---------------------------------------------------------------------------  --------------  SCRIPT ANSWERS  Relationship to Patient? Self  Appointment reason? Symptomatic  Select script based on patient symptoms? Adult Mental Health [Depression   Anxiety   Panic Attacks   Substance Abuse]   Are you having thoughts of hurting yourself or others? No  Are you seeing or hearing things that may not be real (present)? No  Do you think other people are trying to hurt or target you? No  Are you feeling sick because you have not used drugs or alcohol recently? No  Are you feeling sick because of using drugs or alcohol recently? No  Are you having depressed feelings? No  Are you suffering from anxiety or panic attacks? Yes  Are your symptoms getting worse? Yes  Have you been diagnosed with   tested for   or told that you are suspected of having COVID-19 (Coronavirus)? No  Have you had a fever or taken medication to treat a fever within the past   3 days? No  Have you had a cough   shortness of breath or flu-like symptoms within the past 3 days? No  Do you currently have flu-like symptoms including fever or chills   cough   shortness of breath   or difficulty breathing   or new loss of taste or smell?  No  (Service Expert  click yes below to proceed with Ambika Kendrick As Usual   Scheduling)?  Yes

## 2021-04-07 ENCOUNTER — TELEPHONE (OUTPATIENT)
Dept: FAMILY MEDICINE CLINIC | Age: 51
End: 2021-04-07

## 2021-04-07 NOTE — TELEPHONE ENCOUNTER
----- Message from Bonnie Cheadle sent at 2021 11:01 AM EDT -----  Subject: Message to Provider    QUESTIONS  Information for Provider? patient had to cancel her appt 21 due to   have to go to a     patient would like to schedule next week on a wednesday or thursday . please call patient    no appt return in callcenter to reschedule for anxiety  ---------------------------------------------------------------------------  --------------  CALL BACK INFO  What is the best way for the office to contact you? OK to leave message on   voicemail  Preferred Call Back Phone Number? [phone]  ---------------------------------------------------------------------------  --------------  SCRIPT ANSWERS  Relationship to Patient?  Self

## 2021-04-08 NOTE — TELEPHONE ENCOUNTER
Can use a sd or available afternoon appt next Wednesday ( I will be here in the afternoon-pls just leave one spot open to pump)

## 2021-04-19 ENCOUNTER — TELEPHONE (OUTPATIENT)
Dept: FAMILY MEDICINE CLINIC | Age: 51
End: 2021-04-19

## 2021-04-19 NOTE — TELEPHONE ENCOUNTER
Can put her on at any of my pump times - 10 am or 2pm or end of morning.  Absolutely get her in sooner as I rescheduled on her previoulsy

## 2021-04-19 NOTE — TELEPHONE ENCOUNTER
Subject: Appointment Request     Reason for Call: Urgent Mental Health     QUESTIONS   Type of Appointment? Established Patient   Reason for appointment request? No appointments available during search   Additional Information for Provider? Patient would like to know if there   is anyway to get a sooner appointment to discuss her increase in anxiety. Patient states she would be okay with a virtual appointment. Patient   screened green. ---------------------------------------------------------------------------   --------------   Oskar Section INFO   What is the best way for the office to contact you? OK to leave message on   voicemail   Preferred Call Back Phone Number? [phone]   ---------------------------------------------------------------------------   --------------   SCRIPT ANSWERS   Relationship to Patient? Self   Have your symptoms changed? No   Appointment reason? Symptomatic   Select script based on patient symptoms? Adult Mental Health [Depression,   Anxiety, Panic Attacks, Substance Abuse]   Are you having thoughts of hurting yourself or others? No   Are you seeing or hearing things that may not be real (present)? No   Do you think other people are trying to hurt or target you? No   Are you feeling sick because you have not used drugs or alcohol recently? No   Are you feeling sick because of using drugs or alcohol recently? No   Are you having depressed feelings? No   Are you suffering from anxiety or panic attacks? Yes   Have you been diagnosed with, tested for, or told that you are suspected   of having COVID-19 (Coronavirus)? No   Have you had a fever or taken medication to treat a fever within the past   3 days? No   Have you had a cough, shortness of breath or flu-like symptoms within the   past 3 days? No   Do you currently have flu-like symptoms including fever or chills, cough,   shortness of breath, or difficulty breathing, or new loss of taste or   smell?  No   (Service Expert - click yes below to proceed with [employer] As Usual   Scheduling)?  Yes

## 2021-04-22 ENCOUNTER — OFFICE VISIT (OUTPATIENT)
Dept: FAMILY MEDICINE CLINIC | Age: 51
End: 2021-04-22
Payer: COMMERCIAL

## 2021-04-22 VITALS
RESPIRATION RATE: 16 BRPM | DIASTOLIC BLOOD PRESSURE: 68 MMHG | SYSTOLIC BLOOD PRESSURE: 120 MMHG | BODY MASS INDEX: 20.78 KG/M2 | HEART RATE: 68 BPM | OXYGEN SATURATION: 98 % | WEIGHT: 149 LBS

## 2021-04-22 DIAGNOSIS — F33.1 MODERATE EPISODE OF RECURRENT MAJOR DEPRESSIVE DISORDER (HCC): ICD-10-CM

## 2021-04-22 DIAGNOSIS — F51.01 PRIMARY INSOMNIA: ICD-10-CM

## 2021-04-22 DIAGNOSIS — F41.1 GAD (GENERALIZED ANXIETY DISORDER): Primary | ICD-10-CM

## 2021-04-22 PROCEDURE — 99214 OFFICE O/P EST MOD 30 MIN: CPT | Performed by: INTERNAL MEDICINE

## 2021-04-22 RX ORDER — ESCITALOPRAM OXALATE 10 MG/1
10 TABLET ORAL DAILY
Qty: 30 TABLET | Refills: 0 | Status: SHIPPED | OUTPATIENT
Start: 2021-04-22 | End: 2021-05-12 | Stop reason: SDUPTHER

## 2021-04-22 RX ORDER — TEMAZEPAM 15 MG/1
15 CAPSULE ORAL NIGHTLY PRN
Qty: 30 CAPSULE | Refills: 0 | Status: SHIPPED | OUTPATIENT
Start: 2021-04-22 | End: 2021-05-14

## 2021-04-22 ASSESSMENT — PATIENT HEALTH QUESTIONNAIRE - PHQ9
SUM OF ALL RESPONSES TO PHQ QUESTIONS 1-9: 1
SUM OF ALL RESPONSES TO PHQ QUESTIONS 1-9: 1

## 2021-04-22 NOTE — PATIENT INSTRUCTIONS
Patient Education        temazepam  Pronunciation:  te CH[place] e vielka  Brand:  Restoril  What is the most important information I should know about temazepam?  Temazepam can slow or stop your breathing, especially if you have recently used an opioid medication, alcohol, or other drugs that can slow your breathing. MISUSE OF THIS MEDICINE CAN CAUSE ADDICTION, OVERDOSE, OR DEATH. Keep the medication in a place where others cannot get to it. Do not use if you are pregnant. What is temazepam?  Temazepam is a benzodiazepine (jeane-daxa-dye-AZE-eh-peen) that is used to treat insomnia (trouble falling or staying asleep). Temazepam may also be used for purposes not listed in this medication guide. What should I discuss with my healthcare provider before taking temazepam?  You should not use this medicine if you are allergic to temazepam or similar medicines (such as alprazolam, diazepam, lorazepam, Ativan, Klonopin, Restoril, Tranxene, Valium, Versed, Xanax, and others). Do not use temazepam if you are pregnant. It could harm the unborn baby. If you use temazepam while you are pregnant, your baby could become dependent on the drug. This can cause life-threatening withdrawal symptoms in the baby after it is born. Babies born dependent on habit-forming medicine may need medical treatment for several weeks. Tell your doctor if you have ever had:  · lung disease or breathing problems;  · depression, mental illness, suicidal thoughts;  · alcoholism or drug addiction; or  · liver or kidney disease. It may not be safe to breastfeed while using this medicine. Ask your doctor about any risk. Temazepam is not approved for use by anyone younger than 25years old. How should I take temazepam?  Follow the directions on your prescription label and read all medication guides. Never use temazepam in larger amounts, or for longer than prescribed. Tell your doctor if you feel an increased urge to use more of this medicine.   Never share temazepam with another person, especially someone with a history of drug abuse or addiction. MISUSE CAN CAUSE ADDICTION, OVERDOSE, OR DEATH. Keep the medication in a place where others cannot get to it. Selling or giving away this medicine is against the law. Take this medicine only when you are getting ready for several hours of sleep. You may fall asleep very quickly after taking the medicine. Call your doctor if your insomnia does not improve after taking temazepam for 7 to 10 nights, or if you have any mood or behavior changes. Insomnia can be a symptom of depression, mental illness, or certain medical conditions. Do not stop using temazepam suddenly or you could have unpleasant withdrawal symptoms. Follow your doctor's instructions about tapering your dose. Store at room temperature away from moisture and heat. Keep track of your medicine. You should be aware if anyone is using it improperly or without a prescription. What happens if I miss a dose? Since temazepam is used when needed, you may not be on a dosing schedule. Take temazepam only when you have time for several hours of sleep. Do not use two doses at one time. What happens if I overdose? Seek emergency medical attention or call the Poison Help line at 1-880.878.7052. An overdose of temazepam can be fatal, especially if taken with alcohol. Overdose symptoms may include extreme drowsiness, confusion, or loss of consciousness. What should I avoid while taking temazepam?  Do not drink alcohol. Dangerous side effects or death could occur. You may still feel sleepy the morning after taking this medicine. Wait until you are fully awake before you drive, operate machinery, or do anything that requires you to be awake and alert. Your reactions may be impaired. What are the possible side effects of temazepam?  Temazepam may cause a severe allergic reaction.  Get emergency medical help if you have signs of an allergic reaction: hives; difficulty breathing; nausea, vomiting; swelling of your face, lips, tongue, or throat. Temazepam can slow or stop your breathing, especially if you have recently used an opioid medication, alcohol, or other drugs that can slow your breathing. A person caring for you should seek emergency medical attention if you have weak or shallow breathing, if you are hard to wake up, or if you stop breathing. Call your doctor at once if you have:  · confusion, agitation, hallucinations;  · depressed mood; or  · thoughts of suicide or hurting yourself. Some people using temazepam have engaged in activity such as driving, eating, making phone calls, or having sex and later having no memory of the activity. Tell your doctor if this happens to you. The sedative effects of temazepam may last longer in older adults. Accidental falls are common in elderly patients who take benzodiazepines. Common side effects may include:  · day-time drowsiness or \"hangover\" feeling;  · headache;  · dizziness, tiredness;  · nausea; or  · feeling nervous. This is not a complete list of side effects and others may occur. Call your doctor for medical advice about side effects. You may report side effects to FDA at 1-103-FDA-9642. What other drugs will affect temazepam?  Taking temazepam with other drugs that make you sleepy or slow your breathing can cause dangerous side effects or death. Ask your doctor before using opioid medication, a sleeping pill, a muscle relaxer, or medicine for anxiety or seizures. Other drugs may affect temazepam, including prescription and over-the-counter medicines, vitamins, and herbal products. Tell your doctor about all your current medicines and any medicine you start or stop using. Where can I get more information?   Your pharmacist can provide more information about temazepam.  Remember, keep this and all other medicines out of the reach of children, never share your medicines with others, and use this medication only for the indication prescribed. Every effort has been made to ensure that the information provided by [address] is accurate, up-to-date, and complete, but no guarantee is made to that effect. Drug information contained herein may be time sensitive. [facility] information has been compiled for use by healthcare practitioners and consumers in the [place] and therefore [facility] does not warrant that uses outside of the [place] are appropriate, unless specifically indicated otherwise. [facility]'s drug information does not endorse drugs, diagnose patients or recommend therapy. [facility]'s drug information is an informational resource designed to assist licensed healthcare practitioners in caring for their patients and/or to serve consumers viewing this service as a supplement to, and not a substitute for, the expertise, skill, knowledge and judgment of healthcare practitioners. The absence of a warning for a given drug or drug combination in no way should be construed to indicate that the drug or drug combination is safe, effective or appropriate for any given patient. [facility] does not assume any responsibility for any aspect of healthcare administered with the aid of information [facility] provides. The information contained herein is not intended to cover all possible uses, directions, precautions, warnings, drug interactions, allergic reactions, or adverse effects. If you have questions about the drugs you are taking, check with your doctor, nurse or pharmacist.  Copyright 3180-6918 [address]: 11.01. Revision date: 12/8/2020. Care instructions adapted under license by [facility] ([facility]). If you have questions about a medical condition or this instruction, always ask your healthcare professional. [place] [address] any warranty or liability for your use of this information.

## 2021-04-22 NOTE — PROGRESS NOTES
Psychiatric/Behavioral: Positive for dysphoric mood and sleep disturbance. Negative for hallucinations and suicidal ideas. The patient is nervous/anxious. Physical Exam  Constitutional:       General: She is not in acute distress. Appearance: Normal appearance. Neurological:      Mental Status: She is alert. Psychiatric:         Mood and Affect: Mood normal.         Behavior: Behavior normal.         Thought Content: Thought content normal.         Judgment: Judgment normal.         An electronic signature was used to authenticate this note.     --Pamela Ponce MD

## 2021-05-11 ENCOUNTER — TELEPHONE (OUTPATIENT)
Dept: PRIMARY CARE CLINIC | Age: 51
End: 2021-05-11

## 2021-05-11 NOTE — TELEPHONE ENCOUNTER
Calling to set up a new pt visit with Ulises mccormick. pls contact the pt directly. thank you!    Omega: [phone]

## 2021-05-12 RX ORDER — ESCITALOPRAM OXALATE 10 MG/1
15 TABLET ORAL DAILY
Qty: 45 TABLET | Refills: 5 | Status: SHIPPED | OUTPATIENT
Start: 2021-05-12 | End: 2021-05-12

## 2021-05-12 RX ORDER — ESCITALOPRAM OXALATE 20 MG/1
20 TABLET ORAL DAILY
Qty: 30 TABLET | Refills: 2 | Status: SHIPPED | OUTPATIENT
Start: 2021-05-12 | End: 2021-05-25 | Stop reason: SDUPTHER

## 2021-05-12 NOTE — TELEPHONE ENCOUNTER
Pt called    Pt is unable to see emerald due to PCP location  I was able to schedule pt with tyree Sanchez

## 2021-05-14 DIAGNOSIS — F41.1 GAD (GENERALIZED ANXIETY DISORDER): ICD-10-CM

## 2021-05-14 RX ORDER — TEMAZEPAM 15 MG/1
CAPSULE ORAL
Qty: 30 CAPSULE | Refills: 2 | Status: SHIPPED | OUTPATIENT
Start: 2021-05-14 | End: 2021-05-25

## 2021-05-24 NOTE — PROGRESS NOTES
PSYCHIATRY INITIAL EVALUATION/DIAGNOSTIC ASSESSMENT    Antoinette Murillo  1970 05/25/21    CC:   Chief Complaint   Patient presents with    Anxiety    New Patient       HPI:   Nina Bates is a 52 y.o. female with h/o anxiety, depression who p/t clinic to establish care with this provider. Referred by Lubna Montoya MD.     Patient endorses that a few years ago she was going through some health issues that increased anxiety and panic. Health issues since then has cleared up and she was doing pretty well. Son diagnosed with mood disorder recently, they attempted to to go a more natural routine which did not go well. He is only 8years old. He will be aggressive, sad, will say things like \" I feel like a mistake. \" Feeling disconnect in the family. Oldest child is distancing himself and two youngest are displaying more anxiety, they are 6 and 8. Hard to watch this effect the entire family. He is back on medications but still struggling. Being seen at [place] [id number]. Issues with him going to school. Feels guilt, blames herself for choosing to switch his medications. Her  is struggling as well. Works currently at [employer], [employer].. Physical Therapist, does 7 hour days. Feels like all of this is triggering her anxiety and depression. Feeling more withdrawn, quiet, shut down. Psych ROS:     Depression: rates 2-3/10 (10 best); decreased sleep previously, Rx temazepam, helping currently. Right now getting enough sleep at night. Appetite poor, not had an appetite since all of this start. Concentration/focus not real good. Feels real forgetful. + guilt about her sons medications changes. Self esteem is on the lower side right now. Some difficulty with ADL completion. Loss of interest including yard work, clean house. Poor energy. Denies tearfulness. Increased isolation. Denies SI/HI.      Cheli: Denies insomnia with increased energy, rapid speech, easily distracted or decreased attention, irritability, racing thoughts, expansive mood, increase in energy and goal directed behavior, grandiosity, flight of ideas    Anxiety: rates 8-9/10 (10 worst); constant worry about son and family. + Irritability, + Sleep disruption. Somatic complaint of tense, nauseated, shaky. + Restlessness, Denies Fatigue;    DENIES fear of doing/saying wrong things, fear of judgement, avoidant of social situations    OCD: Denies repetitive actions or rituals, excessive hand washing, contamination fears, need for symmetry, violent thoughts, hoarding, fear of being harmed, mental or verbal repetition of words or phrases and counting    Panic:  Denies shortness of breath, dizziness, diaphoresis, trembling, palpitations, feeing of choking, nausea, feeling outside of self, numbness, chills, hot flashes, chest discomfort and fear of dying    Phobias: Denies shortness of breath, trembling, increased heart rate, panic, dread, terror, horror, awareness that fear is out of proportion to the actual threat, overwhelming urge to escape the situation and intense measures taken to steer clear of the feared object or situation    Psychosis: Denies A/VH, paranoia, delusions    ADHD: Denies         PTSD:Denies nightmares, flashbacks, hypervigilance, easily startled, decreased sleep, reliving the event, avoiding situations that remind you of trauma, physical and mental paralysis when reminded of the experience, same despair, easily angry or irritable, trouble concentrating, fear for safety,  Numbness of emotions, feeling of detachment    Eating disorders: Denies      VERONIKA 7 SCORE 5/25/2021 7/24/2019 7/10/2019   VERONIKA-7 Total Score 18 12 16     Interpretation of VERONIKA-7 score: 5-9 = mild anxiety, 10-14 = moderate anxiety, 15+ = severe anxiety. Recommend referral to behavioral health for scores 10 or greater.     PHQ-9 Total Score: 22 (5/25/2021  2:40 PM)  Thoughts that you would be better off dead, or of hurting yourself in some way: 0 (5/25/2021  2:40 PM)    Interpretation of PHQ-9 score:  1-4 = minimal depression, 5-9 = mild depression, 10-14 = moderate depression; 15-19 = moderately severe depression, 20-27 = severe depression    History obtained from patient and chart (confirmed by patient today). Past Psychiatric History:    Prior hospitalizations: Denies   Prior diagnoses: Anxiety, Depression   Outpatient Treatment: Counseling    Psychiatrist: Denies    Therapist: Saw a counselor when her son first started having these issues   Suicide Attempts: Denies   Hx SH:  Denies    Past Psychopharmacologic Trials (including response/reactions):  Pristiq- fidgety   Trazodone- racing heart        Substance Use History:   Nicotine: Denies  Social History     Tobacco Use   Smoking Status Never Smoker   Smokeless Tobacco Never Used      Alcohol: Rarely   Illicits: Denies   Caffeine: Denies   Rehabs/Complicated W/D: Denies    Past Medical/Surgical History:   Past Medical History:   Diagnosis Date    Acute headache     Collar bone fracture 1978    VERONIKA (generalized anxiety disorder)     Juvenile idiopathic scoliosis of thoracic region 9/20/2018    Meningitis     Had as a child    Tibia fracture 1974     Past Surgical History:   Procedure Laterality Date    TONSILLECTOMY      WISDOM TOOTH EXTRACTION           PCP: Vicky Flores MD      Social/Developmental History:    Developmental: Born and raised/upbringing: [place], [place]. Raised by both parents. Good relationships with them.    Marital: Katarzyna Bush, 15 years of marriage   Children: 4 children   Family: 4 siblings, close with her siblings   Housing: Just with immediate family   Occupation/Income: PT at [employer]   Education: [address]: Denies              Taoism: Confucianism   Legal hx: Denies   Trauma hx: Denies   Violence hx: Denies   Access to firearms: No    Primary Support System: , Khalif or BRICE, Malathi    Family History:    Medical/Psychiatric History:  Family History   Problem atrophy or abnormal movements  Gait/station: normal  Speech    Soft volume, spontaneous and normal rate  Mood    Anxious  Depressed  Affect    depressed affect Congruent to thought content and mood  Thought Content    excessive guilt, no delusions voiced  Thought Process    linear   Associations    logical connections  Perceptions: denies AH/VH, does not appear preoccupied with the internal environment  Insight    Good  Judgment    Intact  Orientation    oriented to person, place, time, and general circumstances  Memory    recent and remote memory intact  Attention/Concentration    intact  Ability to understand instructions Yes  Ability to respond meaningfully Yes  Language: [address] knowledge/Intellect: Average  SI:   no suicidal ideation  HI: Denies HI    Labs:   Lab Review   No visits with results within 6 Month(s) from this visit. Latest known visit with results is:   Office Visit on 10/21/2020   Component Date Value    Dermatology Pathology Re* 10/21/2020 SEE COMMENTS        Last Drug screen:   Lab Results   Component Value Date    LABAMPH Neg 08/17/2012    LABBENZ Neg 08/17/2012    COCAIMETSCRU Neg 08/17/2012    LABMETH Neg 08/17/2012    OPIATESCREENURINE Neg 08/17/2012    PHENCYCLIDINESCREENURINE Neg 08/17/2012       Imaging: no head imaging on file      ASSESSMENT AND PLAN     Diagnosis Orders   1. Situational anxiety  escitalopram (LEXAPRO) 20 MG tablet    busPIRone (BUSPAR) 5 MG tablet   2. Psychophysiological insomnia  temazepam (RESTORIL) 30 MG capsule   3. MDD (major depressive disorder), recurrent episode, mild (HCC)  escitalopram (LEXAPRO) 20 MG tablet         1. Safety: NO Imminent risk of danger to/self/others based on the factors considered below. Appropriate for outpatient level of care. Safety plan includes: 911, PES, hotlines, and interventions discussed today. Risk factors:  depressed mood,no outpatient services in place and no collateral information to support safety. Protective factors: Age >25 and <55, female gender,denies suicidal ideation, does not have lethal plan, does not have access to guns or weapons, no prior suicide attempts, no family h/o suicide, no substance abuse, patient has social or family support, no active psychosis or cognitive dysfunction, physically healthy,  compliant with recommended medications, and patient is future oriented. 2. Psychiatric Plan    Situational Anxiety/psychophysiological Insomnia, MDD, mild, recurrent:    - Continue Temazepam 30 mg nightly for insomnia r/t anxiety. No side effects at this point. Noticing benefits. Sent in new Rx for 30 mg. Last was refilled at 15 mg for 30 pills so she will be running out soon. Reviewed OARRS. -Continue Lexapro 20 mg once daily for management of anxiety, depression. No s/e. - Add Buspar 5 mg BID for management of daily increased anxiety. Side effects discussed. Going to take 2.5 mg BID for the first week due to genesight results. - Consider Wellbutrin in the future    -Labs: reviewed in [address] therapy. Going to be starting with her previous counselor soon    -OARRS reviewed, c/w history  -R/b/se/a d/w pt who consents. 3. Medical  -Following with Britton Washington MD    4. Substance   -No active issues. 5. RTC -4 weeks    [facility] PAM Massey CNP  Psychiatric Mental Health Nurse Practitioner     On this date 5/25/2021 I have spent 40 minutes reviewing previous notes, test results and face to face with the patient discussing the diagnosis and importance of compliance with the treatment plan as well as documenting on the day of the visit.

## 2021-05-25 ENCOUNTER — OFFICE VISIT (OUTPATIENT)
Dept: PSYCHIATRY | Age: 51
End: 2021-05-25
Payer: COMMERCIAL

## 2021-05-25 DIAGNOSIS — F41.8 SITUATIONAL ANXIETY: Primary | ICD-10-CM

## 2021-05-25 DIAGNOSIS — F51.04 PSYCHOPHYSIOLOGICAL INSOMNIA: ICD-10-CM

## 2021-05-25 DIAGNOSIS — F33.0 MDD (MAJOR DEPRESSIVE DISORDER), RECURRENT EPISODE, MILD (HCC): ICD-10-CM

## 2021-05-25 PROCEDURE — 99203 OFFICE O/P NEW LOW 30 MIN: CPT | Performed by: NURSE PRACTITIONER

## 2021-05-25 RX ORDER — TEMAZEPAM 30 MG/1
CAPSULE ORAL
Qty: 30 CAPSULE | Refills: 0 | Status: SHIPPED | OUTPATIENT
Start: 2021-05-25 | End: 2021-06-15 | Stop reason: SDUPTHER

## 2021-05-25 RX ORDER — ESCITALOPRAM OXALATE 20 MG/1
20 TABLET ORAL DAILY
Qty: 90 TABLET | Refills: 0 | Status: SHIPPED | OUTPATIENT
Start: 2021-05-25 | End: 2021-08-27 | Stop reason: SDUPTHER

## 2021-05-25 RX ORDER — BUSPIRONE HYDROCHLORIDE 5 MG/1
5 TABLET ORAL 2 TIMES DAILY
Qty: 60 TABLET | Refills: 0 | Status: SHIPPED
Start: 2021-05-25 | End: 2021-05-31

## 2021-05-25 ASSESSMENT — PATIENT HEALTH QUESTIONNAIRE - PHQ9
9. THOUGHTS THAT YOU WOULD BE BETTER OFF DEAD, OR OF HURTING YOURSELF: 0
10. IF YOU CHECKED OFF ANY PROBLEMS, HOW DIFFICULT HAVE THESE PROBLEMS MADE IT FOR YOU TO DO YOUR WORK, TAKE CARE OF THINGS AT HOME, OR GET ALONG WITH OTHER PEOPLE: 3
6. FEELING BAD ABOUT YOURSELF - OR THAT YOU ARE A FAILURE OR HAVE LET YOURSELF OR YOUR FAMILY DOWN: 3
8. MOVING OR SPEAKING SO SLOWLY THAT OTHER PEOPLE COULD HAVE NOTICED. OR THE OPPOSITE, BEING SO FIGETY OR RESTLESS THAT YOU HAVE BEEN MOVING AROUND A LOT MORE THAN USUAL: 3
SUM OF ALL RESPONSES TO PHQ9 QUESTIONS 1 & 2: 6
1. LITTLE INTEREST OR PLEASURE IN DOING THINGS: 3
3. TROUBLE FALLING OR STAYING ASLEEP: 3
SUM OF ALL RESPONSES TO PHQ QUESTIONS 1-9: 22

## 2021-05-25 ASSESSMENT — ANXIETY QUESTIONNAIRES
5. BEING SO RESTLESS THAT IT IS HARD TO SIT STILL: 3-NEARLY EVERY DAY
4. TROUBLE RELAXING: 3-NEARLY EVERY DAY
3. WORRYING TOO MUCH ABOUT DIFFERENT THINGS: 2-OVER HALF THE DAYS
7. FEELING AFRAID AS IF SOMETHING AWFUL MIGHT HAPPEN: 3-NEARLY EVERY DAY

## 2021-05-25 NOTE — PATIENT INSTRUCTIONS
Mobile Crisis, Emergency Psychiatric Clinic for patients in need of medication and or a Psychiatrist, temporarily. [facility] [address], [address]. ([facility]). [phone]      [address] [place] [address] [phone]  [phone])    National Suicide Prevention Lifeline  (383) 273-TALK (3095)  www.suicidepreventionlifeline. Shellie Oliver [address] (PES): [phone]  [address] [place] [address]) provides authorization for Crisis Stabilization services in [facility] for both Adults and Children.   Phone: [phone]  Fax: [phone]  [address]    Mobile Crisis Team: [phone]    Text Support  Text [phone] \"connect\" if suicidal for contact via text or phone call

## 2021-05-31 RX ORDER — BUPROPION HYDROCHLORIDE 150 MG/1
150 TABLET ORAL EVERY MORNING
Qty: 30 TABLET | Refills: 3 | Status: SHIPPED | OUTPATIENT
Start: 2021-05-31 | End: 2021-06-25 | Stop reason: SDUPTHER

## 2021-06-15 DIAGNOSIS — F51.04 PSYCHOPHYSIOLOGICAL INSOMNIA: ICD-10-CM

## 2021-06-15 RX ORDER — TEMAZEPAM 30 MG/1
CAPSULE ORAL
Qty: 30 CAPSULE | Refills: 0 | Status: SHIPPED | OUTPATIENT
Start: 2021-06-24 | End: 2021-07-24

## 2021-06-23 NOTE — PROGRESS NOTES
PSYCHIATRY FOLLOW UP EVALUATION      Callie E Bonhaus  1970 06/25/21    CC:   Chief Complaint   Patient presents with    Anxiety    Follow-up       HPI:   Referred by Pamela Ponce MD.     Opal Amaral is a 52 y.o. female with a history of anxiety and depression being evaluated by a Virtual Visit (video visit) encounter to address concerns as mentioned above. A caregiver was present when appropriate. Due to this being a TeleHealth encounter (During PHAAQ-89 public health emergency), evaluation of the following organ systems was limited: Vitals/Constitutional/EENT/Resp/CV/GI//MS/Neuro/Skin/Heme-Lymph-Imm. Pursuant to the emergency declaration under the [address] [place] [address], [address] [place] [address] authority and the [employer] Act, this Virtual Visit was conducted with patient's (and/or legal guardian's) consent, to reduce the patient's risk of exposure to COVID-19 and provide necessary medical care. The patient (and/or legal guardian) has also been advised to contact this office for worsening conditions or problems, and seek emergency medical treatment and/or call 911 if deemed necessary. Patient identification was verified at the start of the visit: Yes    Services were provided through a video synchronous discussion virtually to substitute for in-person clinic visit. Patient was located at home and provider was located in office or at home. Since initial evaluation, patient states that things have been going pretty well since her last visit. Able to focus and concentrate with the addition of Wellbutrin. States anxiety seems to be more controlled. Continues to be forgetful however, maybe distracted with some anxiety. Still having the same issues and stressors with her son. Not doing well with his health issues. Other kids are doing pretty well. No issues at work, continues to enjoy her work. Not too stressful. No sleep issues, mood improving, anxiety improving. Less irritable. But continues to deal with anxiety of her sons struggles. Psych ROS:     Depression: rates 2-3/10 (10 best); decreased sleep previously, Rx temazepam, helping currently. Right now getting enough sleep at night. Appetite poor, not had an appetite since all of this start. Concentration/focus not real good. Feels real forgetful. + guilt about her sons medications changes. Self esteem is on the lower side right now. Some difficulty with ADL completion. Loss of interest including yard work, clean house. Poor energy. Denies tearfulness. Increased isolation. Denies SI/HI.      Cheli: Denies insomnia with increased energy, rapid speech, easily distracted or decreased attention, irritability, racing thoughts, expansive mood, increase in energy and goal directed behavior, grandiosity, flight of ideas     Anxiety: rates 8-9/10 (10 worst); constant worry about son and family. + Irritability, + Sleep disruption.   Somatic complaint of tense, nauseated, shaky. + Restlessness, Denies Fatigue;    DENIES fear of doing/saying wrong things, fear of judgement, avoidant of social situations     OCD: Denies repetitive actions or rituals, excessive hand washing, contamination fears, need for symmetry, violent thoughts, hoarding, fear of being harmed, mental or verbal repetition of words or phrases and counting     Panic:  Denies shortness of breath, dizziness, diaphoresis, trembling, palpitations, feeing of choking, nausea, feeling outside of self, numbness, chills, hot flashes, chest discomfort and fear of dying     Phobias: Denies shortness of breath, trembling, increased heart rate, panic, dread, terror, horror, awareness that fear is out of proportion to the actual threat, overwhelming urge to escape the situation and intense measures taken to steer clear of the feared object or situation     Psychosis: Denies A/VH, paranoia, delusions     ADHD: Denies         PTSD:Denies nightmares, flashbacks, hypervigilance, easily startled, decreased sleep, reliving the event, avoiding situations that remind you of trauma, physical and mental paralysis when reminded of the experience, same despair, easily angry or irritable, trouble concentrating, fear for safety,  Numbness of emotions, feeling of detachment     Eating disorders: Denies    VERONIKA 7 SCORE 5/25/2021 7/24/2019 7/10/2019   VERONIKA-7 Total Score 18 12 16     Interpretation of VERONIKA-7 score: 5-9 = mild anxiety, 10-14 = moderate anxiety,   15+ = severe anxiety. Recommend referral to behavioral health for scores 10 or greater.     No data recorded  Interpretation of PHQ-9 score:  1-4 = minimal depression, 5-9 = mild depression,   10-14 = moderate depression; 15-19 = moderately severe depression, 20-27 = severe depression      Past Psychiatric History:               Prior hospitalizations: Denies              Prior diagnoses: Anxiety, Depression              Outpatient Treatment: Counseling                          Psychiatrist: Denies                          Therapist: Saw a counselor when her son first started having these issues              Suicide Attempts: Denies              Hx SH:  Denies     Past Psychopharmacologic Trials (including response/reactions):  Pristiq- fidgety   Trazodone- racing heart     Past Medical/Surgical History:   Past Medical History:   Diagnosis Date    Acute headache     Collar bone fracture 1978    VERONIKA (generalized anxiety disorder)     Juvenile idiopathic scoliosis of thoracic region 9/20/2018    Meningitis     Had as a child    Tibia fracture 1974     Past Surgical History:   Procedure Laterality Date    TONSILLECTOMY      WISDOM TOOTH EXTRACTION         Family History   Problem Relation Age of Onset    Cancer Mother         skin benign    Other Mother         factor 5 +/bronciectisis    Heart Disease Father     Cancer Father         skin benign    Other Sister         cardiolipin + PCP: Darya Robbins MD      Allergies: No Known Allergies      Current Medications:   Current Outpatient Medications on File Prior to Visit   Medication Sig Dispense Refill    temazepam (RESTORIL) 30 MG capsule TAKE ONE CAPSULE BY MOUTH EVERY EVENING AS NEEDED FOR SLEEP FOR UP TO 30 DAYS 30 capsule 0    escitalopram (LEXAPRO) 20 MG tablet Take 1 tablet by mouth daily 90 tablet 0    Omega-3 Fatty Acids (FISH OIL CONCENTRATE PO) 1 teaspoon by mouth 3 times daily.  Multiple Vitamin (MULTI VITAMIN DAILY PO) Take 1 tablet by mouth daily        No current facility-administered medications on file prior to visit. Controlled Substance Monitoring:  PDMP Monitoring:    Last PDMP [place] as Reviewed [facility]):  Review User Review Instant Review Result   OCTAVIO LOREDO 6/25/2021 11:55 AM Reviewed PDMP [1]       Urine Drug Screenings (1 yr)     Drug screen multi urine  Collected: 8/17/2012  6:00 PM (Final result)    Narrative: This method is a screening test to detect only these drug classes as  part of a medical workup. Confirmatory testing by another method should  be ordered if clinically indicated. Complete Results              Medication Contract and Consent for Opioid Use Documents Filed      No documents found              OBJECTIVE:  Vitals:    Wt Readings from Last 3 Encounters:   04/22/21 149 lb (67.6 kg)   10/14/20 163 lb (73.9 kg)   03/13/20 154 lb (69.9 kg)       ROS: Denies trouble with fever, rash, headache, vision changes, chest pain, shortness of breath, nausea, extremity pain, weakness, dysuria.     Mental Status Exam:      Appearance    alert, cooperative, appropriate dress for season, well groomed, appears stated age, VV today  Muscle strength/tone: no atrophy or abnormal movements  Gait/station: normal  Speech    Soft volume, spontaneous and normal rate  Mood    Neutral  Affect    Depressed affect Congruent to thought content and mood  Thought Content    excessive guilt, no delusions voiced  Thought Process    linear   Associations    logical connections  Perceptions: denies AH/VH, does not appear preoccupied with the internal environment  Insight    Good  Judgment    Intact  Orientation    oriented to person, place, time, and general circumstances  Memory    recent and remote memory intact  Attention/Concentration    intact  Ability to understand instructions Yes  Ability to respond meaningfully Yes  Language: [address] of knowledge/Intellect: Average  SI:   no suicidal ideation  HI: Denies HI    Labs:     No visits with results within 6 Month(s) from this visit. Latest known visit with results is:   Office Visit on 10/21/2020   Component Date Value    Dermatology Pathology Re* 10/21/2020 SEE COMMENTS        Last Drug screen:  Lab Results   Component Value Date    LABAMPH Neg 08/17/2012    LABBENZ Neg 08/17/2012    COCAIMETSCRU Neg 08/17/2012    LABMETH Neg 08/17/2012    OPIATESCREENURINE Neg 08/17/2012    PHENCYCLIDINESCREENURINE Neg 08/17/2012           Imaging: no head imaging on file      ASSESSMENT AND PLAN     Diagnosis Orders   1. MDD (major depressive disorder), recurrent episode, mild (HCC)  buPROPion (WELLBUTRIN XL) 300 MG extended release tablet   2. Situational anxiety  buPROPion (WELLBUTRIN XL) 300 MG extended release tablet       1. Safety: NO Imminent risk of danger to/self/others based on the factors considered below. Appropriate for outpatient level of care.   Safety plan includes: 911, PES, hotlines, and interventions discussed today.      Risk factors:  depressed mood,no outpatient services in place and no collateral information to support safety.     Protective factors: Age >25 and <55, female gender,denies suicidal ideation, does not have lethal plan, does not have access to guns or weapons, no prior suicide attempts, no family h/o suicide, no substance abuse, patient has social or family support, no active psychosis or cognitive dysfunction, physically healthy,

## 2021-06-25 ENCOUNTER — VIRTUAL VISIT (OUTPATIENT)
Dept: PSYCHIATRY | Age: 51
End: 2021-06-25
Payer: COMMERCIAL

## 2021-06-25 DIAGNOSIS — F41.8 SITUATIONAL ANXIETY: ICD-10-CM

## 2021-06-25 DIAGNOSIS — F33.0 MDD (MAJOR DEPRESSIVE DISORDER), RECURRENT EPISODE, MILD (HCC): Primary | ICD-10-CM

## 2021-06-25 PROCEDURE — 99212 OFFICE O/P EST SF 10 MIN: CPT | Performed by: NURSE PRACTITIONER

## 2021-06-25 RX ORDER — BUPROPION HYDROCHLORIDE 300 MG/1
300 TABLET ORAL EVERY MORNING
Qty: 30 TABLET | Refills: 1 | Status: SHIPPED | OUTPATIENT
Start: 2021-06-25 | End: 2021-07-06

## 2021-06-25 RX ORDER — BUPROPION HYDROCHLORIDE 150 MG/1
150 TABLET ORAL EVERY MORNING
Qty: 30 TABLET | Refills: 3 | Status: SHIPPED | OUTPATIENT
Start: 2021-06-25 | End: 2021-06-25

## 2021-07-06 DIAGNOSIS — F41.8 SITUATIONAL ANXIETY: ICD-10-CM

## 2021-07-06 DIAGNOSIS — F33.0 MDD (MAJOR DEPRESSIVE DISORDER), RECURRENT EPISODE, MILD (HCC): ICD-10-CM

## 2021-07-06 RX ORDER — BUPROPION HYDROCHLORIDE 150 MG/1
150 TABLET ORAL EVERY MORNING
Qty: 30 TABLET | Refills: 1 | Status: SHIPPED | OUTPATIENT
Start: 2021-07-06 | End: 2021-08-27

## 2021-07-26 DIAGNOSIS — F51.04 PSYCHOPHYSIOLOGICAL INSOMNIA: Primary | ICD-10-CM

## 2021-07-26 RX ORDER — TEMAZEPAM 30 MG/1
30 CAPSULE ORAL NIGHTLY PRN
Qty: 30 CAPSULE | Refills: 5 | Status: SHIPPED | OUTPATIENT
Start: 2021-07-26 | End: 2022-01-22

## 2021-08-25 NOTE — PROGRESS NOTES
PSYCHIATRY PROGRESS NOTE    Abiodun Maid Chidi  1970 08/27/21      CC:   Chief Complaint   Patient presents with    Anxiety    Follow-up       HPI:   Patti Flores is a 54 y.o. female with h/o anxiety and depression who p/t clinic for follow up. Since initial evaluation, patient states that things continue to be a struggle at home with her son. Causing her a great deal of anxiety throughout the day. Feels as if her anxiety is effecting her depression. Struggling to get up out of bed in the morning, anxious about how the day is going to go with her son. Worries about him. 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 Sleep issues resolved with Temazepam. Patient increased Wellbutrin to 300 mg. Worried about the amount of medications she is on. Explained that this is not forever and when she starts to see improvement in her son, then perhaps her mood/anxiety levels will stabilize. Patient agrees. Psych ROS:     Depression: Continues to rate similarly. Sleep improved. Denies SI. Historically rates 2-3/10 (10 best); decreased sleep previously, Rx temazepam, helping currently. Right now getting enough sleep at night. Appetite poor, not had an appetite since all of this start. Concentration/focus not real good. Feels real forgetful. + guilt about her sons medications changes. Self esteem is on the lower side right now. Some difficulty with ADL completion. Loss of interest including yard work, clean house. Poor energy. Denies tearfulness. Increased isolation. Denies SI/HI.      Cheli: Denies insomnia with increased energy, rapid speech, easily distracted or decreased attention, irritability, racing thoughts, expansive mood, increase in energy and goal directed behavior, grandiosity, flight of ideas     Anxiety: Continues to rate the same.    Historically rates 8-9/10 (10 worst); constant worry about son and family. + Irritability, + Sleep disruption.  Somatic complaint of tense, nauseated, shaky. + Restlessness, Denies Fatigue;    DENIES fear of doing/saying wrong things, fear of judgement, avoidant of social situations     OCD: Denies repetitive actions or rituals, excessive hand washing, contamination fears, need for symmetry, violent thoughts, hoarding, fear of being harmed, mental or verbal repetition of words or phrases and counting     Panic:  Denies shortness of breath, dizziness, diaphoresis, trembling, palpitations, feeing of choking, nausea, feeling outside of self, numbness, chills, hot flashes, chest discomfort and fear of dying     Phobias: Denies shortness of breath, trembling, increased heart rate, panic, dread, terror, horror, awareness that fear is out of proportion to the actual threat, overwhelming urge to escape the situation and intense measures taken to steer clear of the feared object or situation     Psychosis: Denies A/VH, paranoia, delusions     ADHD: Denies          PTSD:Denies nightmares, flashbacks, hypervigilance, easily startled, decreased sleep, reliving the event, avoiding situations that remind you of trauma, physical and mental paralysis when reminded of the experience, same despair, easily angry or irritable, trouble concentrating, fear for safety,  Numbness of emotions, feeling of detachment     Eating disorders: Denies    VERONIKA 7 SCORE 8/27/2021 5/25/2021 7/24/2019 7/10/2019   VERONIKA-7 Total Score 14 18 12 16     Interpretation of VERONIKA-7 score: 5-9 = mild anxiety, 10-14 = moderate anxiety,   15+ = severe anxiety. Recommend referral to behavioral health for scores 10 or greater.     PHQ-9 Total Score: 14 (8/27/2021 11:24 AM)  Thoughts that you would be better off dead, or of hurting yourself in some way: 0 (8/27/2021 11:24 AM)    Interpretation of PHQ-9 score:  1-4 = minimal depression, 5-9 = mild depression,   10-14 = moderate depression; 15-19 = moderately severe depression, 20-27 = severe depression      Past Psychiatric History:             IWXTN hospitalizations: Denies              CYHMI diagnoses: Anxiety, Depression              Outpatient Treatment: Counseling                          Psychiatrist: Denies                          IHDSJXFLM: Saw a counselor when her son first started having these issues              Suicide Attempts: Denies              Hx SH:  Denies     Past Psychopharmacologic Trials (including response/reactions):  Pristiq- Fidgety   Effexor- Up all night  Trazodone- racing heart    Past Medical/Surgical History:   Past Medical History:   Diagnosis Date    Acute headache     Collar bone fracture 1978    VERONIKA (generalized anxiety disorder)     Juvenile idiopathic scoliosis of thoracic region 9/20/2018    Meningitis     Had as a child    Tibia fracture 1974     Past Surgical History:   Procedure Laterality Date    TONSILLECTOMY      WISDOM TOOTH EXTRACTION         Family History   Problem Relation Age of Onset    Cancer Mother         skin benign    Other Mother         factor 5 +/bronciectisis    Heart Disease Father     Cancer Father         skin benign    Other Sister         cardiolipin +         PCP: Taylor Liriano MD      Allergies: No Known Allergies      Current Medications:   Current Outpatient Medications on File Prior to Visit   Medication Sig Dispense Refill    temazepam (RESTORIL) 30 MG capsule Take 1 capsule by mouth nightly as needed for Sleep or Anxiety for up to 180 days. 30 capsule 5    Omega-3 Fatty Acids (FISH OIL CONCENTRATE PO) 1 teaspoon by mouth 3 times daily.  Multiple Vitamin (MULTI VITAMIN DAILY PO) Take 1 tablet by mouth daily        No current facility-administered medications on file prior to visit.        Controlled Substance Monitoring:  PDMP Monitoring:    Last PDMP [place] as Reviewed [facility]):  Review User Review Instant Review Result   OCTAVIO LOREDO 6/25/2021 11:55 AM Reviewed PDMP [1]       Urine Drug Screenings (1 yr)     Drug screen multi urine  Collected: 8/17/2012  6:00 PM (Final result)    Narrative: This method is a screening test to detect only these drug classes as  part of a medical workup. Confirmatory testing by another method should  be ordered if clinically indicated. Complete Results              Medication Contract and Consent for Opioid Use Documents Filed      No documents found                OBJECTIVE:  Vitals: Wt Readings from Last 3 Encounters:   04/22/21 149 lb (67.6 kg)   10/14/20 163 lb (73.9 kg)   03/13/20 154 lb (69.9 kg)         ROS: Denies trouble with fever, rash, headache, vision changes, chest pain, shortness of breath, nausea, extremity pain, weakness, dysuria. Mental Status Exam:     Appearance    alert, cooperative, appropriate dress for season, well groomed, appears stated age  Muscle strength/tone: no atrophy or abnormal movements  Gait/station: normal  Speech    Soft volume, spontaneous and normal rate  Mood    Neutral  Affect    Depressed affect Congruent to thought content and mood  Thought Content    excessive guilt, no delusions voiced  Thought Process    linear   Associations    logical connections  Perceptions: denies AH/VH, does not appear preoccupied with the internal environment  Insight    Good  Judgment    Intact  Orientation    oriented to person, place, time, and general circumstances  Memory    recent and remote memory intact  Attention/Concentration    intact  Ability to understand instructions Yes  Ability to respond meaningfully Yes  Language: [employer] of knowledge/Intellect: Average  SI:   no suicidal ideation  HI: Denies HI      Labs:     No visits with results within 6 Month(s) from this visit.    Latest known visit with results is:   Office Visit on 10/21/2020   Component Date Value    Dermatology Pathology Re* 10/21/2020 SEE COMMENTS        Last Drug screen:  Lab Results   Component Value Date    LABAMPH Neg 08/17/2012    LABBENZ Neg 08/17/2012    COCAIMETSCRU Neg 08/17/2012    LABMETH Neg

## 2021-08-27 ENCOUNTER — OFFICE VISIT (OUTPATIENT)
Dept: PSYCHIATRY | Age: 51
End: 2021-08-27
Payer: COMMERCIAL

## 2021-08-27 DIAGNOSIS — F41.8 SITUATIONAL ANXIETY: ICD-10-CM

## 2021-08-27 DIAGNOSIS — F33.0 MDD (MAJOR DEPRESSIVE DISORDER), RECURRENT EPISODE, MILD (HCC): ICD-10-CM

## 2021-08-27 PROCEDURE — 99213 OFFICE O/P EST LOW 20 MIN: CPT | Performed by: NURSE PRACTITIONER

## 2021-08-27 RX ORDER — BUPROPION HYDROCHLORIDE 300 MG/1
300 TABLET ORAL EVERY MORNING
Qty: 30 TABLET | Refills: 5 | Status: SHIPPED | OUTPATIENT
Start: 2021-08-27 | End: 2022-10-27

## 2021-08-27 RX ORDER — BUSPIRONE HYDROCHLORIDE 5 MG/1
5 TABLET ORAL 2 TIMES DAILY
Qty: 60 TABLET | Refills: 5 | Status: SHIPPED | OUTPATIENT
Start: 2021-08-27 | End: 2022-06-16

## 2021-08-27 RX ORDER — ESCITALOPRAM OXALATE 20 MG/1
20 TABLET ORAL DAILY
Qty: 90 TABLET | Refills: 0 | Status: SHIPPED | OUTPATIENT
Start: 2021-08-27 | End: 2022-06-16

## 2021-08-27 ASSESSMENT — PATIENT HEALTH QUESTIONNAIRE - PHQ9
SUM OF ALL RESPONSES TO PHQ QUESTIONS 1-9: 14
8. MOVING OR SPEAKING SO SLOWLY THAT OTHER PEOPLE COULD HAVE NOTICED. OR THE OPPOSITE, BEING SO FIGETY OR RESTLESS THAT YOU HAVE BEEN MOVING AROUND A LOT MORE THAN USUAL: 1
2. FEELING DOWN, DEPRESSED OR HOPELESS: 2
4. FEELING TIRED OR HAVING LITTLE ENERGY: 2
5. POOR APPETITE OR OVEREATING: 1
9. THOUGHTS THAT YOU WOULD BE BETTER OFF DEAD, OR OF HURTING YOURSELF: 0
1. LITTLE INTEREST OR PLEASURE IN DOING THINGS: 2
7. TROUBLE CONCENTRATING ON THINGS, SUCH AS READING THE NEWSPAPER OR WATCHING TELEVISION: 2
SUM OF ALL RESPONSES TO PHQ9 QUESTIONS 1 & 2: 4
3. TROUBLE FALLING OR STAYING ASLEEP: 1
10. IF YOU CHECKED OFF ANY PROBLEMS, HOW DIFFICULT HAVE THESE PROBLEMS MADE IT FOR YOU TO DO YOUR WORK, TAKE CARE OF THINGS AT HOME, OR GET ALONG WITH OTHER PEOPLE: 2
SUM OF ALL RESPONSES TO PHQ QUESTIONS 1-9: 14
SUM OF ALL RESPONSES TO PHQ QUESTIONS 1-9: 14
6. FEELING BAD ABOUT YOURSELF - OR THAT YOU ARE A FAILURE OR HAVE LET YOURSELF OR YOUR FAMILY DOWN: 3

## 2021-08-27 ASSESSMENT — ANXIETY QUESTIONNAIRES
GAD7 TOTAL SCORE: 14
2. NOT BEING ABLE TO STOP OR CONTROL WORRYING: 3-NEARLY EVERY DAY
3. WORRYING TOO MUCH ABOUT DIFFERENT THINGS: 2-OVER HALF THE DAYS
6. BECOMING EASILY ANNOYED OR IRRITABLE: 1-SEVERAL DAYS
1. FEELING NERVOUS, ANXIOUS, OR ON EDGE: 2-OVER HALF THE DAYS
7. FEELING AFRAID AS IF SOMETHING AWFUL MIGHT HAPPEN: 3-NEARLY EVERY DAY
5. BEING SO RESTLESS THAT IT IS HARD TO SIT STILL: 0-NOT AT ALL
4. TROUBLE RELAXING: 3-NEARLY EVERY DAY

## 2022-06-16 ENCOUNTER — OFFICE VISIT (OUTPATIENT)
Dept: FAMILY MEDICINE CLINIC | Age: 52
End: 2022-06-16
Payer: COMMERCIAL

## 2022-06-16 VITALS
HEART RATE: 69 BPM | WEIGHT: 171 LBS | HEIGHT: 71 IN | DIASTOLIC BLOOD PRESSURE: 79 MMHG | BODY MASS INDEX: 23.94 KG/M2 | SYSTOLIC BLOOD PRESSURE: 111 MMHG | RESPIRATION RATE: 12 BRPM

## 2022-06-16 DIAGNOSIS — F32.A ANXIETY AND DEPRESSION: Primary | ICD-10-CM

## 2022-06-16 DIAGNOSIS — Z12.11 COLON CANCER SCREENING: ICD-10-CM

## 2022-06-16 DIAGNOSIS — F41.9 ANXIETY AND DEPRESSION: Primary | ICD-10-CM

## 2022-06-16 DIAGNOSIS — Z83.49 FAMILY HISTORY OF THYROID DISEASE: ICD-10-CM

## 2022-06-16 DIAGNOSIS — Z86.39 HISTORY OF THYROID NODULE: ICD-10-CM

## 2022-06-16 DIAGNOSIS — Z83.2 FAMILY HISTORY OF FACTOR V LEIDEN MUTATION: ICD-10-CM

## 2022-06-16 PROCEDURE — 99214 OFFICE O/P EST MOD 30 MIN: CPT | Performed by: FAMILY MEDICINE

## 2022-06-16 RX ORDER — TEMAZEPAM 7.5 MG/1
7.5-15 CAPSULE ORAL NIGHTLY PRN
COMMUNITY
Start: 2022-04-12 | End: 2022-07-11

## 2022-06-16 RX ORDER — ESCITALOPRAM OXALATE 20 MG/1
30 TABLET ORAL DAILY
Qty: 135 TABLET | Refills: 0
Start: 2022-06-16 | End: 2022-10-27

## 2022-06-16 SDOH — ECONOMIC STABILITY: FOOD INSECURITY: WITHIN THE PAST 12 MONTHS, YOU WORRIED THAT YOUR FOOD WOULD RUN OUT BEFORE YOU GOT MONEY TO BUY MORE.: NEVER TRUE

## 2022-06-16 SDOH — ECONOMIC STABILITY: FOOD INSECURITY: WITHIN THE PAST 12 MONTHS, THE FOOD YOU BOUGHT JUST DIDN'T LAST AND YOU DIDN'T HAVE MONEY TO GET MORE.: NEVER TRUE

## 2022-06-16 ASSESSMENT — PATIENT HEALTH QUESTIONNAIRE - PHQ9
8. MOVING OR SPEAKING SO SLOWLY THAT OTHER PEOPLE COULD HAVE NOTICED. OR THE OPPOSITE, BEING SO FIGETY OR RESTLESS THAT YOU HAVE BEEN MOVING AROUND A LOT MORE THAN USUAL: 0
SUM OF ALL RESPONSES TO PHQ QUESTIONS 1-9: 0
1. LITTLE INTEREST OR PLEASURE IN DOING THINGS: 0
2. FEELING DOWN, DEPRESSED OR HOPELESS: 0
6. FEELING BAD ABOUT YOURSELF - OR THAT YOU ARE A FAILURE OR HAVE LET YOURSELF OR YOUR FAMILY DOWN: 0
SUM OF ALL RESPONSES TO PHQ QUESTIONS 1-9: 0
5. POOR APPETITE OR OVEREATING: 0
9. THOUGHTS THAT YOU WOULD BE BETTER OFF DEAD, OR OF HURTING YOURSELF: 0
3. TROUBLE FALLING OR STAYING ASLEEP: 0
SUM OF ALL RESPONSES TO PHQ QUESTIONS 1-9: 0
10. IF YOU CHECKED OFF ANY PROBLEMS, HOW DIFFICULT HAVE THESE PROBLEMS MADE IT FOR YOU TO DO YOUR WORK, TAKE CARE OF THINGS AT HOME, OR GET ALONG WITH OTHER PEOPLE: 0
SUM OF ALL RESPONSES TO PHQ QUESTIONS 1-9: 0
SUM OF ALL RESPONSES TO PHQ9 QUESTIONS 1 & 2: 0
7. TROUBLE CONCENTRATING ON THINGS, SUCH AS READING THE NEWSPAPER OR WATCHING TELEVISION: 0
4. FEELING TIRED OR HAVING LITTLE ENERGY: 0

## 2022-06-16 ASSESSMENT — SOCIAL DETERMINANTS OF HEALTH (SDOH): HOW HARD IS IT FOR YOU TO PAY FOR THE VERY BASICS LIKE FOOD, HOUSING, MEDICAL CARE, AND HEATING?: NOT HARD AT ALL

## 2022-06-16 NOTE — PROGRESS NOTES
A/P:    Diagnosis Orders   1. Anxiety and depression  TSH    T4, Free    T3, Free    Comprehensive Metabolic Panel    CBC with Auto Differential   2. Colon cancer screening  Cologuard   3. Family history of factor V Leiden mutation  FACTOR 5 LEIDEN   4. History of thyroid nodule  TSH    T4, Free    T3, Free    THYROID PEROXIDASE ANTIBODY   5. Family history of thyroid disease       Her anxiety and depression are reasonably controlled given her circumstances, will check above lab work, she will continue following with the [facility]    With her history of thyroid nodule and family history of thyroid disease, will check above thyroid labs    She agrees to factor V Leiden mutation testing    Colon cancer screening options discussed, she is asymptomatic and there is no immediate family history of colon cancer, she elects to proceed with Cologuard testing    Follow-up in 6 months or sooner if needed    O: /79   Pulse 69   Resp 12   Ht 5' 11\" (1.803 m)   Wt 171 lb (77.6 kg)   BMI 23.85 kg/m²    Gen- NAD, pleasant  HEENT- Eyes without icterus or injection  Neck- Supple, no lymphadenopathy appreciated  Lungs- CTAB  Heart- RRR  Abd- Soft, non tender  Ext- No edema  Psych-appropriate, no si/hi, seems a bit anxious    S: CC-establish care  HPI-patient presents to establish care with new clinic provider. She reports she is doing reasonably well overall. She is now seeing the [facility] for her depression and anxiety. She notes that there is a lot of situational stress and grieving going along with son's Hashimoto's encephalopathy diagnosis. His personality and cognition have changed. She reports she is doing reasonably well on her medicines. Her mother did have factor V Leiden mutation.   She reports that she is up-to-date with her Pap smear and that she will be scheduling her mammogram.    ROS- ROS  Denies fever, chills, night sweats  Denies headaches, sore throat, ear pain  Denies chest pain, dyspnea, palpitations  Denies nausea, vomiting, diarrhea  Denies joint pain  Denies rashes      Patient's history was reviewed and updated

## 2022-10-27 ENCOUNTER — TELEMEDICINE (OUTPATIENT)
Dept: FAMILY MEDICINE CLINIC | Age: 52
End: 2022-10-27
Payer: COMMERCIAL

## 2022-10-27 DIAGNOSIS — J01.80 OTHER ACUTE SINUSITIS, RECURRENCE NOT SPECIFIED: Primary | ICD-10-CM

## 2022-10-27 DIAGNOSIS — Z12.11 COLON CANCER SCREENING: ICD-10-CM

## 2022-10-27 PROCEDURE — 99213 OFFICE O/P EST LOW 20 MIN: CPT | Performed by: FAMILY MEDICINE

## 2022-10-27 RX ORDER — AMOXICILLIN AND CLAVULANATE POTASSIUM 875; 125 MG/1; MG/1
1 TABLET, FILM COATED ORAL 2 TIMES DAILY
Qty: 16 TABLET | Refills: 0 | Status: SHIPPED | OUTPATIENT
Start: 2022-10-27 | End: 2022-11-04

## 2023-01-31 ENCOUNTER — TELEPHONE (OUTPATIENT)
Dept: FAMILY MEDICINE CLINIC | Age: 53
End: 2023-01-31

## 2023-01-31 DIAGNOSIS — Z12.31 ENCOUNTER FOR SCREENING MAMMOGRAM FOR BREAST CANCER: Primary | ICD-10-CM

## 2023-01-31 NOTE — TELEPHONE ENCOUNTER
I spoke with patient regarding the cologuard test and patient states she will get to it. I also placed referral for mammogram and gave patient the number to call and make appointment.

## 2023-02-10 ENCOUNTER — TELEPHONE (OUTPATIENT)
Dept: FAMILY MEDICINE CLINIC | Age: 53
End: 2023-02-10

## 2023-02-10 NOTE — TELEPHONE ENCOUNTER
Call transfer from [facility] ([facility]). Patient called stating she has ankle edema for the last 4 days. States her shoes are leaving marks on her ankles. Denies sob, fatigue, chest pain. Spoke with Dr ROJAS, advised patient to be seen Monday at 200 am or if sxs worsen over the weekend to be seen. Left a message with info above and appt time. Instructed patient to call back if she has any additional questions.

## 2023-03-27 ENCOUNTER — TELEPHONE (OUTPATIENT)
Dept: FAMILY MEDICINE CLINIC | Age: 53
End: 2023-03-27

## 2023-04-27 ENCOUNTER — OFFICE VISIT (OUTPATIENT)
Dept: FAMILY MEDICINE CLINIC | Age: 53
End: 2023-04-27
Payer: COMMERCIAL

## 2023-04-27 DIAGNOSIS — R60.9 EDEMA, UNSPECIFIED TYPE: Primary | ICD-10-CM

## 2023-04-27 DIAGNOSIS — F33.1 MODERATE EPISODE OF RECURRENT MAJOR DEPRESSIVE DISORDER (HCC): ICD-10-CM

## 2023-04-27 DIAGNOSIS — Z83.2 FAMILY HISTORY OF HYPERCOAGULABILITY: ICD-10-CM

## 2023-04-27 DIAGNOSIS — R07.89 CHEST TIGHTNESS: ICD-10-CM

## 2023-04-27 DIAGNOSIS — R63.5 WEIGHT GAIN: ICD-10-CM

## 2023-04-27 PROCEDURE — 99214 OFFICE O/P EST MOD 30 MIN: CPT | Performed by: FAMILY MEDICINE

## 2023-04-27 RX ORDER — ARIPIPRAZOLE 2 MG/1
2 TABLET ORAL DAILY
COMMUNITY
Start: 2023-04-11

## 2023-04-27 NOTE — PROGRESS NOTES
Appointment was done audiovisually. Patient gave consent for an audiovisual visit. Patient was in their state of residency, [place] at time of visit. Parties involved in visit were myself, nurse, and patient. A/P:    Diagnosis Orders   1. Edema, unspecified type  Comprehensive Metabolic Panel    CBC with Auto Differential    TSH    Brain Natriuretic Peptide    Urinalysis      2. Chest tightness  EKG 12 Lead      3. Weight gain        4. Moderate episode of recurrent major depressive disorder ([facility] [address]          Above labs and EKG ordered, she will come in tomorrow around 9 AM to have done    For the edema, her weight gain, increased coffee use, possible medication side effect could be contributing, she will try compression hose and elevation, she will cut her coffee use down by half    If her chest tightness recurs, she is to be evaluated    She will talk with psychiatry today about her weight gain, it seems like Rexulti or Tuyet Kos would be less likely to cause weight gain    We will arrange follow-up based on results and clinical response      O: There were no vitals taken for this visit. Gen- NAD, pleasant  HEENT- Eyes without icterus or injection  Lungs- no increased work of breathing appreciated  Psych- Appropriate, no SI/HI    S: CC-swelling  HPI- Daja reports swelling of her ankles and feet for 2 to 3 months. She denies pain. She has been drinking more coffee of late. She has went from a size 8 to size 16 since starting Abilify in the past few months. She will be seeing her psychiatrist today. She did notice some brief chest tightness on the treadmill recently. She denies chest denies dyspnea, palpitations. Both her parents have heart disease, but father not under 54 and mother not under 72.     ROS- Per HPI    Patient's medications, allergies, and past medical hx were reviewed

## 2023-04-28 ENCOUNTER — NURSE ONLY (OUTPATIENT)
Dept: FAMILY MEDICINE CLINIC | Age: 53
End: 2023-04-28
Payer: COMMERCIAL

## 2023-04-28 DIAGNOSIS — R60.9 EDEMA, UNSPECIFIED TYPE: ICD-10-CM

## 2023-04-28 DIAGNOSIS — I49.3 VENTRICULAR ECTOPIC BEAT: Primary | ICD-10-CM

## 2023-04-28 DIAGNOSIS — R07.89 CHEST TIGHTNESS: ICD-10-CM

## 2023-04-28 LAB
ALBUMIN SERPL-MCNC: 4.3 G/DL (ref 3.4–5)
ALBUMIN/GLOB SERPL: 1.9 {RATIO} (ref 1.1–2.2)
ALP SERPL-CCNC: 68 U/L (ref 40–129)
ALT SERPL-CCNC: 29 U/L (ref 10–40)
ANION GAP SERPL CALCULATED.3IONS-SCNC: 13 MMOL/L (ref 3–16)
AST SERPL-CCNC: 26 U/L (ref 15–37)
BACTERIA URNS QL MICRO: NORMAL /HPF
BASOPHILS # BLD: 0 K/UL (ref 0–0.2)
BASOPHILS NFR BLD: 0.4 %
BILIRUB SERPL-MCNC: 0.3 MG/DL (ref 0–1)
BILIRUB UR QL STRIP.AUTO: NEGATIVE
BUN SERPL-MCNC: 9 MG/DL (ref 7–20)
CALCIUM SERPL-MCNC: 9.7 MG/DL (ref 8.3–10.6)
CHLORIDE SERPL-SCNC: 104 MMOL/L (ref 99–110)
CLARITY UR: CLEAR
CO2 SERPL-SCNC: 21 MMOL/L (ref 21–32)
COLOR UR: YELLOW
CREAT SERPL-MCNC: 0.9 MG/DL (ref 0.6–1.1)
DEPRECATED RDW RBC AUTO: 14.4 % (ref 12.4–15.4)
EOSINOPHIL # BLD: 0.2 K/UL (ref 0–0.6)
EOSINOPHIL NFR BLD: 3.4 %
EPI CELLS #/AREA URNS AUTO: 0 /HPF (ref 0–5)
GFR SERPLBLD CREATININE-BSD FMLA CKD-EPI: >60 ML/MIN/{1.73_M2}
GLUCOSE SERPL-MCNC: 103 MG/DL (ref 70–99)
GLUCOSE UR STRIP.AUTO-MCNC: NEGATIVE MG/DL
HCT VFR BLD AUTO: 43.1 % (ref 36–48)
HGB BLD-MCNC: 14.2 G/DL (ref 12–16)
HGB UR QL STRIP.AUTO: NEGATIVE
HYALINE CASTS #/AREA URNS AUTO: 0 /LPF (ref 0–8)
KETONES UR STRIP.AUTO-MCNC: NEGATIVE MG/DL
LEUKOCYTE ESTERASE UR QL STRIP.AUTO: NEGATIVE
LYMPHOCYTES # BLD: 1.7 K/UL (ref 1–5.1)
LYMPHOCYTES NFR BLD: 35.4 %
MCH RBC QN AUTO: 29.1 PG (ref 26–34)
MCHC RBC AUTO-ENTMCNC: 33.1 G/DL (ref 31–36)
MCV RBC AUTO: 88.1 FL (ref 80–100)
MONOCYTES # BLD: 0.4 K/UL (ref 0–1.3)
MONOCYTES NFR BLD: 7.6 %
NEUTROPHILS # BLD: 2.6 K/UL (ref 1.7–7.7)
NEUTROPHILS NFR BLD: 53.2 %
NITRITE UR QL STRIP.AUTO: NEGATIVE
NT-PROBNP SERPL-MCNC: 54 PG/ML (ref 0–124)
PH UR STRIP.AUTO: 8 [PH] (ref 5–8)
PLATELET # BLD AUTO: 202 K/UL (ref 135–450)
PMV BLD AUTO: 9.1 FL (ref 5–10.5)
POTASSIUM SERPL-SCNC: 4.6 MMOL/L (ref 3.5–5.1)
PROT SERPL-MCNC: 6.6 G/DL (ref 6.4–8.2)
PROT UR STRIP.AUTO-MCNC: ABNORMAL MG/DL
RBC # BLD AUTO: 4.89 M/UL (ref 4–5.2)
RBC CLUMPS #/AREA URNS AUTO: 2 /HPF (ref 0–4)
SODIUM SERPL-SCNC: 138 MMOL/L (ref 136–145)
SP GR UR STRIP.AUTO: 1.02 (ref 1–1.03)
SPECIMEN SOURCE: NORMAL
TSH SERPL DL<=0.005 MIU/L-ACNC: 1.14 UIU/ML (ref 0.27–4.2)
UA DIPSTICK W REFLEX MICRO PNL UR: YES
URN SPEC COLLECT METH UR: ABNORMAL
UROBILINOGEN UR STRIP-ACNC: 0.2 E.U./DL
WBC # BLD AUTO: 4.9 K/UL (ref 4–11)
WBC #/AREA URNS AUTO: 0 /HPF (ref 0–5)

## 2023-04-28 PROCEDURE — 36415 COLL VENOUS BLD VENIPUNCTURE: CPT | Performed by: FAMILY MEDICINE

## 2023-04-28 PROCEDURE — 93000 ELECTROCARDIOGRAM COMPLETE: CPT | Performed by: FAMILY MEDICINE

## 2023-04-28 PROCEDURE — 81003 URINALYSIS AUTO W/O SCOPE: CPT | Performed by: FAMILY MEDICINE

## 2023-05-03 DIAGNOSIS — I49.3 PVC (PREMATURE VENTRICULAR CONTRACTION): Primary | ICD-10-CM

## 2023-05-03 LAB
F5 P.R506Q BLD/T QL: NEGATIVE
MAGNESIUM SERPL-MCNC: 2.1 MG/DL (ref 1.8–2.4)
SPECIMEN SOURCE: NORMAL

## 2023-05-17 ENCOUNTER — OFFICE VISIT (OUTPATIENT)
Dept: DERMATOLOGY | Age: 53
End: 2023-05-17
Payer: COMMERCIAL

## 2023-05-17 DIAGNOSIS — Z86.018 HISTORY OF DYSPLASTIC NEVUS: ICD-10-CM

## 2023-05-17 DIAGNOSIS — L82.1 SEBORRHEIC KERATOSES: ICD-10-CM

## 2023-05-17 DIAGNOSIS — D22.9 BENIGN NEVUS: Primary | ICD-10-CM

## 2023-05-17 PROCEDURE — 99213 OFFICE O/P EST LOW 20 MIN: CPT | Performed by: DERMATOLOGY

## 2023-05-17 RX ORDER — CHLORAL HYDRATE 500 MG
CAPSULE ORAL DAILY
COMMUNITY

## 2023-05-17 NOTE — PROGRESS NOTES
[facility]) Dermatology  Lawrence Ballard M.D.  [phone]       Jorge Brooks  1970    50 y.o. female     Date of Visit: 5/17/2023    Chief Complaint:   Chief Complaint   Patient presents with    Skin Lesion        I was asked to see this patient by Dr. Lopez ref. provider found. History of Present Illness:  1. TBSE    Multiple nevi. Patient has not noticed any new or changing pigmented lesions. Stable in size, shape, color. Not itching, bleeding. Seborrheic keratoses. Increasing number of hyperkeratotic stuck on papules and plaques over the torso. No discrete lesion itching, bleeding, becoming symptomatic. Skin history:  + hats/ SPF. No prior h/o tanning bed     No PH skin cancer  1/16 right medial breast dysplastic nevus with mild dysplasia  2/19 right mid paraspinal back dysplastic nevus with mild dysplasia completely excised  10/20 left lower back dysplastic nevus with mild dysplasia     + mother / father with NMSC    Review of Systems:  Constitutional: Reports general sense of well-being       Past Medical History, Surgical History, Family History, Medications and Allergies reviewed. 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    Tobacco Use    Smoking status: Never    Smokeless tobacco: Never   Vaping Use    Vaping Use: Never used   Substance and Sexual Activity    Alcohol use: Yes     Comment: social    Drug use: No    Sexual activity: Yes     Partners: Male     Comment: Khalif   Other Topics Concern    Not on file   Social History Narrative    , 4 children, works part time as physical therapist, likes [employer]     Social Determinants of Health     Financial Resource Strain: Low Risk     Difficulty of Paying Living Expenses: Not hard at all   Food

## 2023-06-28 ENCOUNTER — NURSE TRIAGE (OUTPATIENT)
Dept: OTHER | Facility: CLINIC | Age: 53
End: 2023-06-28

## 2023-07-27 ENCOUNTER — HOSPITAL ENCOUNTER (OUTPATIENT)
Dept: NON INVASIVE DIAGNOSTICS | Age: 53
Discharge: HOME | End: 2023-07-27
Payer: COMMERCIAL

## 2023-07-27 ENCOUNTER — HOSPITAL ENCOUNTER (OUTPATIENT)
Dept: VASCULAR LAB | Age: 53
Discharge: HOME | End: 2023-07-27
Payer: COMMERCIAL

## 2023-07-27 DIAGNOSIS — R60.0 BILATERAL LEG EDEMA: ICD-10-CM

## 2023-07-27 DIAGNOSIS — I83.893 VARICOSE VEINS OF BOTH LEGS WITH EDEMA: ICD-10-CM

## 2023-07-27 DIAGNOSIS — R07.89 ATYPICAL CHEST PAIN: ICD-10-CM

## 2023-07-27 PROCEDURE — 93017 CV STRESS TEST TRACING ONLY: CPT

## 2023-07-27 PROCEDURE — 93970 EXTREMITY STUDY: CPT

## 2023-08-17 ENCOUNTER — HOSPITAL ENCOUNTER (OUTPATIENT)
Dept: NON INVASIVE DIAGNOSTICS | Age: 53
Discharge: HOME | End: 2023-08-17
Payer: COMMERCIAL

## 2023-08-17 DIAGNOSIS — R60.0 BILATERAL LEG EDEMA: ICD-10-CM

## 2023-08-17 LAB
LV EF: 58 %
LVEF MODALITY: NORMAL

## 2023-08-17 PROCEDURE — 93306 TTE W/DOPPLER COMPLETE: CPT

## 2023-08-31 LAB
CHOLEST SERPL-MCNC: 156 MG/DL (ref 0–199)
GLUCOSE SERPL-MCNC: 121 MG/DL (ref 70–99)
HDLC SERPL-MCNC: 49 MG/DL (ref 40–60)
LDLC SERPL CALC-MCNC: 88 MG/DL
TRIGL SERPL-MCNC: 96 MG/DL (ref 0–150)

## 2023-08-31 NOTE — PROGRESS NOTES
Appointment was done audiovisually. Patient gave consent for an audiovisual visit. Patient was in their state of residency, [place] at time of visit. Parties involved in visit were myself, nurse, and patient. A/P:    Diagnosis Orders   1. Other acute sinusitis, recurrence not specified          Augmentin 875 mg twice daily x8 days prescribed, supportive care measures recommended as well    Patient to call or be evaluated if symptoms worsen or fail to improve/resolve. O: There were no vitals taken for this visit. Gen- NAD, pleasant  HEENT- Eyes without icterus or injection  Lungs- no increased work of breathing appreciated  Psych- Appropriate    S: CC-nasal congestion, cough, body aches  HPI-patient reports starting with nasal congestion, cough, body aches 10 to 14 days ago. She had a headache initially that resolved. Her congestion continues and she is blowing out some thick yellow discharge. She has some cough and chest congestion, but this is minor compared to her nasal symptoms. She denies fever, chest pain, dyspnea, vomiting, diarrhea.     ROS- Per HPI    Patient's medications, allergies, and past medical hx were reviewed Pt is a 78y M w/ PMHx bladder CA, HLD, CVA (May), with residual left-sided deficits presented initially for sharp upper back pain (between shoulder blades, non-radiating) with lower leg edema. While in the ED pt had cardiac arrest, V-Fib, he underwent CPR and electric shock, he was intubated and admitted to ICU, placed IV pressor     #Cardiac arrest   #VFib 2/2 Pulmonary Edema/ACS  #Acute hypoxic respiratory failure s/p intubation in ED 7/22  # Pulmonary Edema   # NSTEMI  - CTA chest was neg for aortic dissection, lungs were clear - 2hrs later he coded and CXR showed pulmonary edema   - s/p CPR, ROSC after 15m  - EKG showed non-specific T wave changes on inferior and lateral leads. Troponin trend 0.08 w/ peak 0.44 then dropped   - Pt was extubated on 7/31  - Echo: LVEF 59%  - Episode of chest pain on 8/2 - Troponin increased from 0.19 to 0.65, EKG showed no new changes  - Per EP, pt is refusing ICD and stent, only considering angiogram without intervention  - s/p cath 8/14 -triple vessel disease - per CT SX - very high risk for sx - family declined - cardio follow up, no further plan for intervention now  - Now that patient is full code, cardio might reconsider PCI later, follow up with cardio as outpatient   - TTE (8/21): LVEF 60-65%, normal systolic and diastolic function  - Had GOC discussion with the patient 8/22 with Dr Anderson and Dr Briones, patient said he wants to rescind DNR/DNI. He is full code now  - plan for life vest application 8/28  - not a candidate for 4a rehab per physiology consult   - f/u case management  - lipitor held on 8/23 for elevated LFTs, c/w holding  - c/w aspirin, plavix  - being fit for lifevest today  - DC planning    #Elevated liver enzymes  #Cholecystitis   - , , Alk phos 472 (8/23)  -   - US RUQ 8/23: Gallbladder sludge and wall thickening. Given negative sonographic Olea's sign, findings are equivocal for acute cholecystitis. May obtain   follow-up with nuclear medicine HIDA scan. Increased hepatic echogenicity, likely representing steatosis.  - HIDA scan 8/23: NO DEFINITE VISUALIZATION OF THE GALLBLADDER THROUGH 4 HOURS POST-INJECTION, CONSISTENT WITH CHOLECYSTITIS, AS DESCRIBED ABOVE.    CLINICAL CORRELATION IS SUGGESTED.  - Surgery consulted =>  Would not recommend any surgical intervention at this time, recommend IR consult for percutaneous cholecystostomy   - IR consulted => poor surgical candidate with no plan for eventual intervention, placing a perc marycruz would result in permanent tube, recommend conservative management at this time  - lipitor held on 8/23  - no abdominal pain today  - levels trending down as of 8/26  - c/w monitoring    #Acute HFpEF  - CXR improved   - c/w PO lasix     #PMHx CVA w/ residual left-sided weakness   - Pt had loop recorder, interrogated by EP - showed VFib   - c/w ASA, Plavix, Lipitor    #Macrocytic Anemia   - HB stable  - Vit b12 1074, Folate 3.9    #Ab Distention resolved  - KUB shows high stool burden w/ no obstruction, f/u KUB shows new dilated loop   - Manual fecal disimpaction 7/27  - c/w Lactulose, Senna, Miralax      DVT ppx: Lovenox  GI ppx: Protonix IV OD  Labs: Daily  Code: Full code

## 2024-05-30 ENCOUNTER — OFFICE VISIT (OUTPATIENT)
Dept: FAMILY MEDICINE CLINIC | Age: 54
End: 2024-05-30
Payer: COMMERCIAL

## 2024-05-30 VITALS
HEIGHT: 71 IN | BODY MASS INDEX: 34.33 KG/M2 | DIASTOLIC BLOOD PRESSURE: 72 MMHG | WEIGHT: 245.2 LBS | OXYGEN SATURATION: 94 % | TEMPERATURE: 97.9 F | SYSTOLIC BLOOD PRESSURE: 120 MMHG | HEART RATE: 61 BPM

## 2024-05-30 DIAGNOSIS — Z83.49 FAMILY HISTORY OF THYROID DISEASE: ICD-10-CM

## 2024-05-30 DIAGNOSIS — R06.83 SNORING: ICD-10-CM

## 2024-05-30 DIAGNOSIS — F32.A ANXIETY AND DEPRESSION: ICD-10-CM

## 2024-05-30 DIAGNOSIS — Z12.11 COLON CANCER SCREENING: ICD-10-CM

## 2024-05-30 DIAGNOSIS — Z00.00 PREVENTATIVE HEALTH CARE: Primary | ICD-10-CM

## 2024-05-30 DIAGNOSIS — R40.0 DAYTIME SOMNOLENCE: ICD-10-CM

## 2024-05-30 DIAGNOSIS — F41.9 ANXIETY AND DEPRESSION: ICD-10-CM

## 2024-05-30 DIAGNOSIS — R63.5 WEIGHT GAIN: ICD-10-CM

## 2024-05-30 LAB
ALBUMIN SERPL-MCNC: 4.1 G/DL (ref 3.4–5)
ALBUMIN/GLOB SERPL: 1.4 {RATIO} (ref 1.1–2.2)
ALP SERPL-CCNC: 74 U/L (ref 40–129)
ALT SERPL-CCNC: 16 U/L (ref 10–40)
ANION GAP SERPL CALCULATED.3IONS-SCNC: 11 MMOL/L (ref 3–16)
AST SERPL-CCNC: 19 U/L (ref 15–37)
BASOPHILS # BLD: 0 K/UL (ref 0–0.2)
BASOPHILS NFR BLD: 0.4 %
BILIRUB SERPL-MCNC: 0.3 MG/DL (ref 0–1)
BUN SERPL-MCNC: 11 MG/DL (ref 7–20)
CALCIUM SERPL-MCNC: 9.4 MG/DL (ref 8.3–10.6)
CHLORIDE SERPL-SCNC: 106 MMOL/L (ref 99–110)
CHOLEST SERPL-MCNC: 160 MG/DL (ref 0–199)
CO2 SERPL-SCNC: 22 MMOL/L (ref 21–32)
CREAT SERPL-MCNC: 0.8 MG/DL (ref 0.6–1.1)
DEPRECATED RDW RBC AUTO: 14.4 % (ref 12.4–15.4)
EOSINOPHIL # BLD: 0.2 K/UL (ref 0–0.6)
EOSINOPHIL NFR BLD: 3.3 %
GFR SERPLBLD CREATININE-BSD FMLA CKD-EPI: 89 ML/MIN/{1.73_M2}
GLUCOSE SERPL-MCNC: 114 MG/DL (ref 70–99)
HCT VFR BLD AUTO: 40.7 % (ref 36–48)
HDLC SERPL-MCNC: 49 MG/DL (ref 40–60)
HGB BLD-MCNC: 14 G/DL (ref 12–16)
LDLC SERPL CALC-MCNC: 91 MG/DL
LYMPHOCYTES # BLD: 1.6 K/UL (ref 1–5.1)
LYMPHOCYTES NFR BLD: 34 %
MCH RBC QN AUTO: 29.4 PG (ref 26–34)
MCHC RBC AUTO-ENTMCNC: 34.4 G/DL (ref 31–36)
MCV RBC AUTO: 85.6 FL (ref 80–100)
MONOCYTES # BLD: 0.4 K/UL (ref 0–1.3)
MONOCYTES NFR BLD: 8.2 %
NEUTROPHILS # BLD: 2.6 K/UL (ref 1.7–7.7)
NEUTROPHILS NFR BLD: 54.1 %
PLATELET # BLD AUTO: 217 K/UL (ref 135–450)
PMV BLD AUTO: 9.2 FL (ref 5–10.5)
POTASSIUM SERPL-SCNC: 4.5 MMOL/L (ref 3.5–5.1)
PROT SERPL-MCNC: 7 G/DL (ref 6.4–8.2)
RBC # BLD AUTO: 4.76 M/UL (ref 4–5.2)
SODIUM SERPL-SCNC: 139 MMOL/L (ref 136–145)
T3FREE SERPL-MCNC: 3 PG/ML (ref 2.3–4.2)
T4 FREE SERPL-MCNC: 1 NG/DL (ref 0.9–1.8)
THYROPEROXIDASE AB SERPL IA-ACNC: 9 IU/ML
TRIGL SERPL-MCNC: 101 MG/DL (ref 0–150)
TSH SERPL DL<=0.005 MIU/L-ACNC: 1.41 UIU/ML (ref 0.27–4.2)
VLDLC SERPL CALC-MCNC: 20 MG/DL
WBC # BLD AUTO: 4.8 K/UL (ref 4–11)

## 2024-05-30 PROCEDURE — 99396 PREV VISIT EST AGE 40-64: CPT | Performed by: FAMILY MEDICINE

## 2024-05-30 SDOH — ECONOMIC STABILITY: FOOD INSECURITY: WITHIN THE PAST 12 MONTHS, THE FOOD YOU BOUGHT JUST DIDN'T LAST AND YOU DIDN'T HAVE MONEY TO GET MORE.: NEVER TRUE

## 2024-05-30 SDOH — ECONOMIC STABILITY: INCOME INSECURITY: HOW HARD IS IT FOR YOU TO PAY FOR THE VERY BASICS LIKE FOOD, HOUSING, MEDICAL CARE, AND HEATING?: NOT HARD AT ALL

## 2024-05-30 SDOH — ECONOMIC STABILITY: HOUSING INSECURITY
IN THE LAST 12 MONTHS, WAS THERE A TIME WHEN YOU DID NOT HAVE A STEADY PLACE TO SLEEP OR SLEPT IN A SHELTER (INCLUDING NOW)?: NO

## 2024-05-30 SDOH — ECONOMIC STABILITY: FOOD INSECURITY: WITHIN THE PAST 12 MONTHS, YOU WORRIED THAT YOUR FOOD WOULD RUN OUT BEFORE YOU GOT MONEY TO BUY MORE.: NEVER TRUE

## 2024-05-30 ASSESSMENT — ANXIETY QUESTIONNAIRES
5. BEING SO RESTLESS THAT IT IS HARD TO SIT STILL: NOT AT ALL
4. TROUBLE RELAXING: NOT AT ALL
6. BECOMING EASILY ANNOYED OR IRRITABLE: SEVERAL DAYS
2. NOT BEING ABLE TO STOP OR CONTROL WORRYING: NOT AT ALL
3. WORRYING TOO MUCH ABOUT DIFFERENT THINGS: NOT AT ALL
IF YOU CHECKED OFF ANY PROBLEMS ON THIS QUESTIONNAIRE, HOW DIFFICULT HAVE THESE PROBLEMS MADE IT FOR YOU TO DO YOUR WORK, TAKE CARE OF THINGS AT HOME, OR GET ALONG WITH OTHER PEOPLE: NOT DIFFICULT AT ALL
7. FEELING AFRAID AS IF SOMETHING AWFUL MIGHT HAPPEN: NOT AT ALL
GAD7 TOTAL SCORE: 1
1. FEELING NERVOUS, ANXIOUS, OR ON EDGE: NOT AT ALL

## 2024-05-30 ASSESSMENT — PATIENT HEALTH QUESTIONNAIRE - PHQ9
3. TROUBLE FALLING OR STAYING ASLEEP: NOT AT ALL
7. TROUBLE CONCENTRATING ON THINGS, SUCH AS READING THE NEWSPAPER OR WATCHING TELEVISION: NOT AT ALL
4. FEELING TIRED OR HAVING LITTLE ENERGY: NOT AT ALL
9. THOUGHTS THAT YOU WOULD BE BETTER OFF DEAD, OR OF HURTING YOURSELF: NOT AT ALL
SUM OF ALL RESPONSES TO PHQ QUESTIONS 1-9: 0
SUM OF ALL RESPONSES TO PHQ9 QUESTIONS 1 & 2: 0
5. POOR APPETITE OR OVEREATING: NOT AT ALL
SUM OF ALL RESPONSES TO PHQ QUESTIONS 1-9: 0
2. FEELING DOWN, DEPRESSED OR HOPELESS: NOT AT ALL
6. FEELING BAD ABOUT YOURSELF - OR THAT YOU ARE A FAILURE OR HAVE LET YOURSELF OR YOUR FAMILY DOWN: NOT AT ALL
SUM OF ALL RESPONSES TO PHQ QUESTIONS 1-9: 0
8. MOVING OR SPEAKING SO SLOWLY THAT OTHER PEOPLE COULD HAVE NOTICED. OR THE OPPOSITE, BEING SO FIGETY OR RESTLESS THAT YOU HAVE BEEN MOVING AROUND A LOT MORE THAN USUAL: NOT AT ALL
10. IF YOU CHECKED OFF ANY PROBLEMS, HOW DIFFICULT HAVE THESE PROBLEMS MADE IT FOR YOU TO DO YOUR WORK, TAKE CARE OF THINGS AT HOME, OR GET ALONG WITH OTHER PEOPLE: NOT DIFFICULT AT ALL
SUM OF ALL RESPONSES TO PHQ QUESTIONS 1-9: 0
1. LITTLE INTEREST OR PLEASURE IN DOING THINGS: NOT AT ALL

## 2024-05-30 NOTE — PROGRESS NOTES
A/P:    Diagnosis Orders   1. Preventative health care  Comprehensive Metabolic Panel    CBC with Auto Differential    Lipid Panel      2. Colon cancer screening  Fecal DNA Colorectal cancer screening (Cologuard)      3. Weight gain  Thyroglobulin    Thyroid Peroxidase Antibody    T3, Free    T4, Free    TSH      4. Family history of thyroid disease  Thyroglobulin    Thyroid Peroxidase Antibody    T3, Free    T4, Free    TSH      5. Anxiety and depression  TSH      6. Daytime somnolence  Jose Patel MD, Sleep Medicine [facility]      7. Snoring  Jose Patel MD, Sleep Medicine [facility]      8. BMI 34.0-34.9,adult  Hemoglobin A1C          Healthy living encouraged, anticipatory guidance provided    She is scheduled to have mammogram and Pap smear with [facility] soon    She agrees to colon cancer screening, new Cologuard kit to be sent    For her weight gain, start workup by checking above lab work    Sleep center referral placed    Follow-up in 6 months or sooner if needed    O:   Vitals:    05/30/24 0840   BP: 120/72   Pulse: 61   Temp: 97.9 °F (36.6 °C)   SpO2: 94%     Gen- NAD, pleasant  HEENT- Eyes without icterus or injection, throat and tms unremarkable  Neck- Supple, no lymphadenopathy appreciated  Lungs- CTAB  Heart- RRR  Abd- Soft, non tender  Ext- No edema  Psych- Appropriate, no s/I/hi    S: CC-physical  HPI-patient presents for physical.  She reports she is doing r reasonably well overall.  She reports her depression and anxiety are controlled.  She reports compliance with Wellbutrin and Lexapro.  She follows up with counselor intermittently.  In the past 2 years she has gained about 100 pounds.  She attributes this to grief/stess associated with son's diagnosis of Hashimoto's encephalopathy and his recovery process.  She would overeat and not exercise.  She has been doing better for the past year and recently did a 5K.  She feels she is in a much better place emotionally.

## 2024-05-31 LAB
EST. AVERAGE GLUCOSE BLD GHB EST-MCNC: 119.8 MG/DL
HBA1C MFR BLD: 5.8 %

## 2024-06-04 LAB — THYROGLOB SERPL-MCNC: 5.6 NG/ML (ref 1.3–31.8)

## 2024-06-10 ENCOUNTER — PATIENT MESSAGE (OUTPATIENT)
Dept: FAMILY MEDICINE CLINIC | Age: 54
End: 2024-06-10

## 2024-06-10 DIAGNOSIS — R73.03 PREDIABETES: Primary | ICD-10-CM

## 2024-06-11 NOTE — TELEPHONE ENCOUNTER
Alicia, Mrs. Murillo would like to do the prediabetes education class, I am not seeing it as an option anymore, is there a program that is available for her?

## 2024-06-25 ENCOUNTER — TELEPHONE (OUTPATIENT)
Dept: FAMILY MEDICINE CLINIC | Age: 54
End: 2024-06-25

## 2024-06-25 NOTE — TELEPHONE ENCOUNTER
Pt has an appointment to f/u on chronic conditions on 12/05/2024. Recent follow up for routine physical 05/04/2024.

## 2024-07-12 LAB
HPV HR 12 DNA SPEC QL NAA+PROBE: NOT DETECTED
HPV16 DNA SPEC QL NAA+PROBE: NOT DETECTED
HPV16+18+H RISK 12 DNA SPEC-IMP: NORMAL
HPV18 DNA SPEC QL NAA+PROBE: NOT DETECTED

## 2024-08-09 ENCOUNTER — TELEPHONE (OUTPATIENT)
Dept: FAMILY MEDICINE CLINIC | Age: 54
End: 2024-08-09

## 2024-08-09 ENCOUNTER — HOSPITAL ENCOUNTER (OUTPATIENT)
Dept: GENERAL RADIOLOGY | Age: 54
Discharge: HOME | End: 2024-08-09
Payer: COMMERCIAL

## 2024-08-09 ENCOUNTER — HOSPITAL ENCOUNTER (OUTPATIENT)
Age: 54
Discharge: HOME | End: 2024-08-09
Payer: COMMERCIAL

## 2024-08-09 ENCOUNTER — OFFICE VISIT (OUTPATIENT)
Dept: FAMILY MEDICINE CLINIC | Age: 54
End: 2024-08-09
Payer: COMMERCIAL

## 2024-08-09 VITALS
BODY MASS INDEX: 34.44 KG/M2 | OXYGEN SATURATION: 94 % | WEIGHT: 246 LBS | SYSTOLIC BLOOD PRESSURE: 112 MMHG | TEMPERATURE: 98 F | HEART RATE: 69 BPM | DIASTOLIC BLOOD PRESSURE: 62 MMHG | HEIGHT: 71 IN

## 2024-08-09 DIAGNOSIS — M89.8X6 PAIN OF LEFT FIBULA: ICD-10-CM

## 2024-08-09 DIAGNOSIS — M25.572 ACUTE LEFT ANKLE PAIN: ICD-10-CM

## 2024-08-09 DIAGNOSIS — M79.662 PAIN IN LEFT LOWER LEG: ICD-10-CM

## 2024-08-09 DIAGNOSIS — M25.572 ACUTE LEFT ANKLE PAIN: Primary | ICD-10-CM

## 2024-08-09 PROCEDURE — 73590 X-RAY EXAM OF LOWER LEG: CPT

## 2024-08-09 PROCEDURE — 73610 X-RAY EXAM OF ANKLE: CPT

## 2024-08-09 PROCEDURE — 99214 OFFICE O/P EST MOD 30 MIN: CPT | Performed by: FAMILY MEDICINE

## 2024-08-09 SDOH — ECONOMIC STABILITY: FOOD INSECURITY: WITHIN THE PAST 12 MONTHS, THE FOOD YOU BOUGHT JUST DIDN'T LAST AND YOU DIDN'T HAVE MONEY TO GET MORE.: NEVER TRUE

## 2024-08-09 SDOH — ECONOMIC STABILITY: FOOD INSECURITY: WITHIN THE PAST 12 MONTHS, YOU WORRIED THAT YOUR FOOD WOULD RUN OUT BEFORE YOU GOT MONEY TO BUY MORE.: NEVER TRUE

## 2024-08-09 SDOH — ECONOMIC STABILITY: INCOME INSECURITY: HOW HARD IS IT FOR YOU TO PAY FOR THE VERY BASICS LIKE FOOD, HOUSING, MEDICAL CARE, AND HEATING?: NOT HARD AT ALL

## 2024-08-09 ASSESSMENT — ANXIETY QUESTIONNAIRES
4. TROUBLE RELAXING: NOT AT ALL
6. BECOMING EASILY ANNOYED OR IRRITABLE: NOT AT ALL
3. WORRYING TOO MUCH ABOUT DIFFERENT THINGS: NOT AT ALL
GAD7 TOTAL SCORE: 0
2. NOT BEING ABLE TO STOP OR CONTROL WORRYING: NOT AT ALL
7. FEELING AFRAID AS IF SOMETHING AWFUL MIGHT HAPPEN: NOT AT ALL
5. BEING SO RESTLESS THAT IT IS HARD TO SIT STILL: NOT AT ALL
1. FEELING NERVOUS, ANXIOUS, OR ON EDGE: NOT AT ALL

## 2024-08-09 ASSESSMENT — PATIENT HEALTH QUESTIONNAIRE - PHQ9
6. FEELING BAD ABOUT YOURSELF - OR THAT YOU ARE A FAILURE OR HAVE LET YOURSELF OR YOUR FAMILY DOWN: NOT AT ALL
5. POOR APPETITE OR OVEREATING: NOT AT ALL
2. FEELING DOWN, DEPRESSED OR HOPELESS: NOT AT ALL
SUM OF ALL RESPONSES TO PHQ QUESTIONS 1-9: 0
9. THOUGHTS THAT YOU WOULD BE BETTER OFF DEAD, OR OF HURTING YOURSELF: NOT AT ALL
3. TROUBLE FALLING OR STAYING ASLEEP: NOT AT ALL
SUM OF ALL RESPONSES TO PHQ QUESTIONS 1-9: 0
SUM OF ALL RESPONSES TO PHQ QUESTIONS 1-9: 0
SUM OF ALL RESPONSES TO PHQ9 QUESTIONS 1 & 2: 0
4. FEELING TIRED OR HAVING LITTLE ENERGY: NOT AT ALL
8. MOVING OR SPEAKING SO SLOWLY THAT OTHER PEOPLE COULD HAVE NOTICED. OR THE OPPOSITE, BEING SO FIGETY OR RESTLESS THAT YOU HAVE BEEN MOVING AROUND A LOT MORE THAN USUAL: NOT AT ALL
1. LITTLE INTEREST OR PLEASURE IN DOING THINGS: NOT AT ALL
7. TROUBLE CONCENTRATING ON THINGS, SUCH AS READING THE NEWSPAPER OR WATCHING TELEVISION: NOT AT ALL
SUM OF ALL RESPONSES TO PHQ QUESTIONS 1-9: 0

## 2024-08-09 NOTE — PROGRESS NOTES
Diagnosis Orders   1. Acute left ankle pain  XR ANKLE LEFT (MIN 3 VIEWS)    XR TIBIA FIBULA LEFT (2 VIEWS)      2. 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    Will arrange follow-up based on results    Over 25 minutes was spent in patient care including chart review, face to face evaluation, coordination of care, documentation on same day of visit          O: /62 (Site: Right Upper Arm, Position: Sitting, Cuff Size: Large Adult)   Pulse 69   Temp 98 °F (36.7 °C)   Ht 1.803 m (5' 11\")   Wt 111.6 kg (246 lb)   SpO2 94%   BMI 34.31 kg/m²   Gen- NAD, pleasant  HEENT- Eyes without icterus or injection  Lungs- CTAB  Heart- RRR  Ext-knee unremarkable, edema of left ankle appreciated, she has tenderness of lateral malleolus and most tenderness of fibula near the head, dorsiflexion makes her  pain worse  Psych- Appropriate, euthymic, no si/hi    S: CC-ankle, knee injury    HPI-patient tripped over blanket 2 days ago and immediately felt pain of left ankle and left upper fibular area.  She reports hyperflexing her knee and it sounds like hyperflexing ankle.  She had trouble walking initially.  She still has trouble walking, but it is better.  At rest, the pain is about a 3 out of 10.  With walking it is about a 7 out of 10.  She has not injured left leg, ankle previously.  She has tried a little bit ice and a little bit of NSAID with some relief.  She is using crutch on left arm with some relief.    ROS- Per HPI    Patient's medications, allergies, and past medical hx were reviewed     Shanta Vargas DO

## 2024-08-09 NOTE — TELEPHONE ENCOUNTER
Please let patient know that her preliminary x-ray reads are back.  There was no fracture seen, but there was some extra fluid of ankle seen.  With her exam findings and pain, I would like her to be seen by orthopedics, [facility]  I would like her to be on light duty for Monday and Tuesday and can then decide from there based on orthopedics evaluation.  Please send her work note through [employer].

## 2024-08-16 ENCOUNTER — PHARMACY VISIT (OUTPATIENT)
Dept: PHARMACY | Age: 54
End: 2024-08-16

## 2024-08-16 DIAGNOSIS — R73.03 PRE-DIABETES: Primary | ICD-10-CM

## 2024-08-16 NOTE — PATIENT INSTRUCTIONS
Patient Instructions:    Install the Jacky 3 reinier.  You can google \"How to apply the Jacky 3 sensor\" for a video that shows steps for applying your sensor.  Try to journal your food intake with the journal provided  Drink water and avoid beverages containing sugar  Gradually increase your physical activity to a goal of 150 minutes per week

## 2024-08-19 NOTE — PROGRESS NOTES
Interventional Cardiology Consultation     Callie E Bonhaus  1970    PCP: Shanta Gonzales DO  Reason for Referral: chest pain, edema   Chief Complaint: \"I am good\"  Chief Complaint   Patient presents with    Follow-up     Yearly-states no concerns     Subjective:     History of Present Illness: The patient is 54 y.o. female with no known cardiac history who initially presented with complaints of intermittent chest pain and LE edema. Patient obtained baseline GXT and echo which were unremarkable. Patient presents today for yearly follow up. Reports overall she is feeling good. Patient currently denies any weight gain, edema, palpitations, chest pain, shortness of breath, dizziness, and syncope.      Past Medical History:   Diagnosis Date    Acute headache     Collar bone fracture 1978    VERONIKA (generalized anxiety disorder)     Insomnia     Juvenile idiopathic scoliosis of thoracic region 09/20/2018    Meningitis     Had as a child    Tibia fracture 1974     Past Surgical History:   Procedure Laterality Date    TONSILLECTOMY      WISDOM TOOTH EXTRACTION       Family History   Problem Relation Age of Onset    Cancer Mother         skin benign    Other Mother         factor 5 +/bronciectisis    Heart Disease Father     Cancer Father         skin benign    Other Sister         cardiolipin +    Other Son         hashimotos encephalopathy     Social History     Tobacco Use    Smoking status: Never    Smokeless tobacco: Never   Vaping Use    Vaping status: Never Used   Substance Use Topics    Alcohol use: Yes     Comment: social    Drug use: No       No Known Allergies    Current Outpatient Medications   Medication Sig Dispense Refill    escitalopram (LEXAPRO) 20 MG tablet Take 1.5 tablets by mouth daily (Patient taking differently: Take 1.5 tablets by mouth daily 1 tablet daily) 135 tablet 0    buPROPion (WELLBUTRIN XL) 300 MG extended release tablet Take 1 tablet by mouth every morning

## 2024-08-21 ENCOUNTER — OFFICE VISIT (OUTPATIENT)
Dept: CARDIOLOGY CLINIC | Age: 54
End: 2024-08-21

## 2024-08-21 VITALS
SYSTOLIC BLOOD PRESSURE: 100 MMHG | WEIGHT: 248 LBS | HEART RATE: 53 BPM | OXYGEN SATURATION: 98 % | DIASTOLIC BLOOD PRESSURE: 70 MMHG | BODY MASS INDEX: 34.72 KG/M2 | HEIGHT: 71 IN

## 2024-08-21 DIAGNOSIS — R60.0 BILATERAL LEG EDEMA: ICD-10-CM

## 2024-08-21 DIAGNOSIS — R07.89 ATYPICAL CHEST PAIN: Primary | ICD-10-CM

## 2024-08-21 DIAGNOSIS — I83.893 VARICOSE VEINS OF BOTH LEGS WITH EDEMA: ICD-10-CM

## 2024-08-28 NOTE — PROGRESS NOTES
provided  Drink water and avoid beverages containing sugar  Gradually increase your physical activity to a goal of 150 minutes per week     No orders of the defined types were placed in this encounter.    No orders of the defined types were placed in this encounter.      [facility] Follow-up:  3 weeks    Electronically signed by Karine Lackey RPH on 2024 at 5:13 PM     Billing Points:    Complex education (new pt, bridging instructions, specific dietary instruction) - 3 points  Adjust dosage and/or reconcile meds (fill pill box) </= 5 medications - 2 points  BASIC ASSESSMENT UNCOMPLICATED (including but not limited to: vital signs; physical assessment screening; review of secondary markers like lab results, allergies, home readings; instructions on treatment plan and basic education; assessment of medication list with one med change and compliance) - 2 points      For Pharmacy Admin Tracking Only    Program: Medication Management  CPA in place:  Yes  Recommendation Provided To: Patient/Caregiver: 2 via In person  Intervention Detail: Adherence Monitorin and Device Training  Intervention Accepted By: Patient/Caregiver: 2  Gap Closed?: Yes   Time Spent (min): 45

## 2024-10-16 ENCOUNTER — TELEPHONE (OUTPATIENT)
Dept: FAMILY MEDICINE CLINIC | Age: 54
End: 2024-10-16

## 2024-10-16 NOTE — TELEPHONE ENCOUNTER
Called pt to cancel appt in December,she said that she will be switching to another office near her work but wanted to speak with dr lunsford to see who she recommends. She can be reached at [phone]

## 2024-12-30 ENCOUNTER — OFFICE VISIT (OUTPATIENT)
Age: 54
End: 2024-12-30
Payer: COMMERCIAL

## 2024-12-30 VITALS
HEIGHT: 71 IN | HEART RATE: 71 BPM | BODY MASS INDEX: 34.88 KG/M2 | OXYGEN SATURATION: 96 % | SYSTOLIC BLOOD PRESSURE: 110 MMHG | WEIGHT: 249.12 LBS | DIASTOLIC BLOOD PRESSURE: 60 MMHG

## 2024-12-30 DIAGNOSIS — F51.01 PRIMARY INSOMNIA: ICD-10-CM

## 2024-12-30 DIAGNOSIS — G47.10 HYPERSOMNIA: Primary | ICD-10-CM

## 2024-12-30 DIAGNOSIS — R06.83 SNORING: ICD-10-CM

## 2024-12-30 DIAGNOSIS — E66.811 OBESITY (BMI 30.0-34.9): ICD-10-CM

## 2024-12-30 PROCEDURE — 99204 OFFICE O/P NEW MOD 45 MIN: CPT | Performed by: STUDENT IN AN ORGANIZED HEALTH CARE EDUCATION/TRAINING PROGRAM

## 2024-12-30 RX ORDER — BUPROPION HYDROCHLORIDE 200 MG/1
200 TABLET, EXTENDED RELEASE ORAL DAILY
COMMUNITY

## 2024-12-30 RX ORDER — ESCITALOPRAM OXALATE 5 MG/1
5 TABLET ORAL DAILY
COMMUNITY

## 2024-12-30 RX ORDER — ESCITALOPRAM OXALATE 10 MG/1
10 TABLET ORAL DAILY
COMMUNITY

## 2024-12-30 ASSESSMENT — SLEEP AND FATIGUE QUESTIONNAIRES
HOW LIKELY ARE YOU TO NOD OFF OR FALL ASLEEP IN A CAR, WHILE STOPPED FOR A FEW MINUTES IN TRAFFIC: SLIGHT CHANCE OF DOZING
HOW LIKELY ARE YOU TO NOD OFF OR FALL ASLEEP WHILE WATCHING TV: SLIGHT CHANCE OF DOZING
ESS TOTAL SCORE: 9
HOW LIKELY ARE YOU TO NOD OFF OR FALL ASLEEP WHILE WATCHING TV: SLIGHT CHANCE OF DOZING
HOW LIKELY ARE YOU TO NOD OFF OR FALL ASLEEP WHILE SITTING QUIETLY AFTER LUNCH WITHOUT ALCOHOL: MODERATE CHANCE OF DOZING
HOW LIKELY ARE YOU TO NOD OFF OR FALL ASLEEP WHILE SITTING QUIETLY AFTER LUNCH WITHOUT ALCOHOL: MODERATE CHANCE OF DOZING
HOW LIKELY ARE YOU TO NOD OFF OR FALL ASLEEP WHILE SITTING INACTIVE IN A PUBLIC PLACE: SLIGHT CHANCE OF DOZING
HOW LIKELY ARE YOU TO NOD OFF OR FALL ASLEEP WHILE SITTING AND TALKING TO SOMEONE: WOULD NEVER DOZE
HOW LIKELY ARE YOU TO NOD OFF OR FALL ASLEEP WHILE LYING DOWN TO REST IN THE AFTERNOON WHEN CIRCUMSTANCES PERMIT: HIGH CHANCE OF DOZING
HOW LIKELY ARE YOU TO NOD OFF OR FALL ASLEEP IN A CAR, WHILE STOPPED FOR A FEW MINUTES IN TRAFFIC: SLIGHT CHANCE OF DOZING
HOW LIKELY ARE YOU TO NOD OFF OR FALL ASLEEP WHEN YOU ARE A PASSENGER IN A CAR FOR AN HOUR WITHOUT A BREAK: WOULD NEVER DOZE
HOW LIKELY ARE YOU TO NOD OFF OR FALL ASLEEP WHILE SITTING AND READING: SLIGHT CHANCE OF DOZING
HOW LIKELY ARE YOU TO NOD OFF OR FALL ASLEEP WHILE LYING DOWN TO REST IN THE AFTERNOON WHEN CIRCUMSTANCES PERMIT: HIGH CHANCE OF DOZING
HOW LIKELY ARE YOU TO NOD OFF OR FALL ASLEEP WHILE SITTING AND TALKING TO SOMEONE: WOULD NEVER DOZE
HOW LIKELY ARE YOU TO NOD OFF OR FALL ASLEEP WHEN YOU ARE A PASSENGER IN A CAR FOR AN HOUR WITHOUT A BREAK: WOULD NEVER DOZE
HOW LIKELY ARE YOU TO NOD OFF OR FALL ASLEEP WHILE SITTING INACTIVE IN A PUBLIC PLACE: SLIGHT CHANCE OF DOZING
HOW LIKELY ARE YOU TO NOD OFF OR FALL ASLEEP WHILE SITTING AND READING: SLIGHT CHANCE OF DOZING

## 2024-12-30 NOTE — PATIENT INSTRUCTIONS
and lower headgear clips, with a magnetic field strength of up to 400mT. When worn, they connect to secure the mask but may inadvertently detach while asleep.  Implants/medical devices, including those listed within contraindications, may be adversely affected if they change function under external magnetic fields or contain ferromagnetic materials that attract/repel to magnetic fields (some metallic implants, e.g., contact lenses with metal, dental implants, metallic cranial plates, screws, andressa hole covers, and bone substitute devices). Consult your physician and  of your implant / other medical device for information on the potential adverse effects of magnetic fields.  Note, not all models or variants of medical devices listed in the contraindications are affected by external magnetic fields. If you are not sure whether an implant or medical device falls under the contraindications, or you require additional information on the potential adverse effects of magnetic fields for your particular implant or medical device, please contact your physician/doctor.    Contact Information   Customers in the [place] with questions about this recall should contact [facility] at call [phone].

## 2024-12-30 NOTE — PROGRESS NOTES
[facility]  Sleep Medicine  [address], [address]  [place] [address]    Chief Complaint   Patient presents with    New Patient     Daytime sleepiness and snoring. Has not had a sleep study yet.        Daja Murillo is a 54 y.o. female who comes in for sleep evaluation.  Her complaints include loud snoring, disrupted sleep/frequent nocturnal awakenings, difficulty falling asleep, and family history of DAYRON. Onset of symptoms was a few years ago.     Pertinent PMHx includes: anxiety and depression, obesity.    She goes to bed at 9-11pm. She falls asleep in variable durations of time.  She awakens a few times per night. She awakens at variable times as late as 9-10am when off but usually around 6:45am. The average total amount of sleep is about 6-8 hours per night. She does not use sleep aid/s. She does take daytime naps when she has time. She describes the symptoms as daytime sleepiness, loud snoring, disrupted sleep, difficulty falling asleep, non refreshed sleep , and difficulty with waking up with the alarm. She does not have symptoms consistent / suggestive of restless legs syndrome. She has felt extremely sleepy while driving. She also reports recent significant weight gain. Previous evaluation and treatment has included: none.    Family hx of sleep disorders:  father and brother with DAYRON  Caffeinated drinks/day: half a cup of coffee  Alcohol intake/day/week: rare  Occupation: PT       DATA REVIEWED TODAY:  Trufant & Insomnia Severity Index scores      12/30/2024     8:53 AM 12/30/2024     6:43 AM   Sleep Medicine   Sitting and reading  1   Watching TV  1   Sitting, inactive in a public place (e.g. a theatre or a meeting)  1   As a passenger in a car for an hour without a break  0   Lying down to rest in the afternoon when circumstances permit  3   Sitting and talking to someone  0   Sitting quietly after a lunch without alcohol  2   In a car, while stopped for a few minutes in

## 2025-01-08 ENCOUNTER — TELEPHONE (OUTPATIENT)
Dept: SLEEP CENTER | Age: 55
End: 2025-01-08

## 2025-01-08 NOTE — TELEPHONE ENCOUNTER
Called to schedule a hst per Jefry     I left the # for Prabhu on vm - as I seen it on the order once calling.       [employer] insurance

## 2025-01-25 ENCOUNTER — HOSPITAL ENCOUNTER (EMERGENCY)
Age: 55
Discharge: HOME | End: 2025-01-25
Attending: EMERGENCY MEDICINE
Payer: COMMERCIAL

## 2025-01-25 ENCOUNTER — APPOINTMENT (OUTPATIENT)
Dept: CT IMAGING | Age: 55
End: 2025-01-25
Payer: COMMERCIAL

## 2025-01-25 ENCOUNTER — APPOINTMENT (OUTPATIENT)
Dept: GENERAL RADIOLOGY | Age: 55
End: 2025-01-25
Payer: COMMERCIAL

## 2025-01-25 VITALS
HEIGHT: 71 IN | DIASTOLIC BLOOD PRESSURE: 82 MMHG | OXYGEN SATURATION: 97 % | RESPIRATION RATE: 12 BRPM | SYSTOLIC BLOOD PRESSURE: 118 MMHG | WEIGHT: 249.12 LBS | BODY MASS INDEX: 34.88 KG/M2 | HEART RATE: 55 BPM | TEMPERATURE: 97.3 F

## 2025-01-25 DIAGNOSIS — R07.9 CHEST PAIN, UNSPECIFIED TYPE: Primary | ICD-10-CM

## 2025-01-25 LAB
ALBUMIN SERPL-MCNC: 4.2 G/DL (ref 3.4–5)
ALBUMIN/GLOB SERPL: 1.6 {RATIO} (ref 1.1–2.2)
ALP SERPL-CCNC: 68 U/L (ref 40–129)
ALT SERPL-CCNC: 23 U/L (ref 10–40)
ANION GAP SERPL CALCULATED.3IONS-SCNC: 9 MMOL/L (ref 3–16)
AST SERPL-CCNC: 23 U/L (ref 15–37)
BASOPHILS # BLD: 0.1 K/UL (ref 0–0.2)
BASOPHILS NFR BLD: 0.8 %
BILIRUB SERPL-MCNC: 0.3 MG/DL (ref 0–1)
BUN SERPL-MCNC: 14 MG/DL (ref 7–20)
CALCIUM SERPL-MCNC: 9.8 MG/DL (ref 8.3–10.6)
CHLORIDE SERPL-SCNC: 107 MMOL/L (ref 99–110)
CO2 SERPL-SCNC: 26 MMOL/L (ref 21–32)
CREAT SERPL-MCNC: 0.9 MG/DL (ref 0.6–1.1)
D-DIMER QUANTITATIVE: 1.62 UG/ML FEU (ref 0–0.6)
DEPRECATED RDW RBC AUTO: 14.2 % (ref 12.4–15.4)
EOSINOPHIL # BLD: 0.3 K/UL (ref 0–0.6)
EOSINOPHIL NFR BLD: 3.4 %
GFR SERPLBLD CREATININE-BSD FMLA CKD-EPI: 77 ML/MIN/{1.73_M2}
GLUCOSE SERPL-MCNC: 112 MG/DL (ref 70–99)
HCT VFR BLD AUTO: 41.3 % (ref 36–48)
HGB BLD-MCNC: 14.2 G/DL (ref 12–16)
LYMPHOCYTES # BLD: 2.5 K/UL (ref 1–5.1)
LYMPHOCYTES NFR BLD: 34 %
MCH RBC QN AUTO: 29.4 PG (ref 26–34)
MCHC RBC AUTO-ENTMCNC: 34.4 G/DL (ref 31–36)
MCV RBC AUTO: 85.6 FL (ref 80–100)
MONOCYTES # BLD: 0.6 K/UL (ref 0–1.3)
MONOCYTES NFR BLD: 8.6 %
NEUTROPHILS # BLD: 3.9 K/UL (ref 1.7–7.7)
NEUTROPHILS NFR BLD: 53.2 %
PLATELET # BLD AUTO: 235 K/UL (ref 135–450)
PMV BLD AUTO: 8.7 FL (ref 5–10.5)
POTASSIUM SERPL-SCNC: 4.3 MMOL/L (ref 3.5–5.1)
PROT SERPL-MCNC: 6.8 G/DL (ref 6.4–8.2)
RBC # BLD AUTO: 4.83 M/UL (ref 4–5.2)
SODIUM SERPL-SCNC: 142 MMOL/L (ref 136–145)
TROPONIN, HIGH SENSITIVITY: 11 NG/L (ref 0–14)
TROPONIN, HIGH SENSITIVITY: <6 NG/L (ref 0–14)
WBC # BLD AUTO: 7.3 K/UL (ref 4–11)

## 2025-01-25 PROCEDURE — 85025 COMPLETE CBC W/AUTO DIFF WBC: CPT

## 2025-01-25 PROCEDURE — 71260 CT THORAX DX C+: CPT

## 2025-01-25 PROCEDURE — 99285 EMERGENCY DEPT VISIT HI MDM: CPT

## 2025-01-25 PROCEDURE — 36415 COLL VENOUS BLD VENIPUNCTURE: CPT

## 2025-01-25 PROCEDURE — 6360000004 HC RX CONTRAST MEDICATION: Performed by: PHYSICIAN ASSISTANT

## 2025-01-25 PROCEDURE — 71046 X-RAY EXAM CHEST 2 VIEWS: CPT

## 2025-01-25 PROCEDURE — 93005 ELECTROCARDIOGRAM TRACING: CPT | Performed by: PHYSICIAN ASSISTANT

## 2025-01-25 PROCEDURE — 6370000000 HC RX 637 (ALT 250 FOR IP): Performed by: PHYSICIAN ASSISTANT

## 2025-01-25 PROCEDURE — 84484 ASSAY OF TROPONIN QUANT: CPT

## 2025-01-25 PROCEDURE — 85379 FIBRIN DEGRADATION QUANT: CPT

## 2025-01-25 PROCEDURE — 80053 COMPREHEN METABOLIC PANEL: CPT

## 2025-01-25 PROCEDURE — 6370000000 HC RX 637 (ALT 250 FOR IP): Performed by: EMERGENCY MEDICINE

## 2025-01-25 RX ORDER — IOPAMIDOL 755 MG/ML
75 INJECTION, SOLUTION INTRAVASCULAR
Status: COMPLETED | OUTPATIENT
Start: 2025-01-25 | End: 2025-01-25

## 2025-01-25 RX ORDER — METOCLOPRAMIDE 10 MG/1
10 TABLET ORAL ONCE
Status: COMPLETED | OUTPATIENT
Start: 2025-01-25 | End: 2025-01-25

## 2025-01-25 RX ORDER — FAMOTIDINE 20 MG/1
20 TABLET, FILM COATED ORAL ONCE
Status: COMPLETED | OUTPATIENT
Start: 2025-01-25 | End: 2025-01-25

## 2025-01-25 RX ORDER — ASPIRIN 81 MG/1
324 TABLET, CHEWABLE ORAL ONCE
Status: COMPLETED | OUTPATIENT
Start: 2025-01-25 | End: 2025-01-25

## 2025-01-25 RX ADMIN — IOPAMIDOL 75 ML: 755 INJECTION, SOLUTION INTRAVENOUS at 03:07

## 2025-01-25 RX ADMIN — METOCLOPRAMIDE 10 MG: 10 TABLET ORAL at 03:16

## 2025-01-25 RX ADMIN — ASPIRIN 324 MG: 81 TABLET, CHEWABLE ORAL at 02:14

## 2025-01-25 RX ADMIN — FAMOTIDINE 20 MG: 20 TABLET, FILM COATED ORAL at 03:16

## 2025-01-25 ASSESSMENT — ENCOUNTER SYMPTOMS
SHORTNESS OF BREATH: 0
COLOR CHANGE: 0
VOMITING: 0

## 2025-01-25 ASSESSMENT — PAIN - FUNCTIONAL ASSESSMENT: PAIN_FUNCTIONAL_ASSESSMENT: 0-10

## 2025-01-25 ASSESSMENT — LIFESTYLE VARIABLES
HOW OFTEN DO YOU HAVE A DRINK CONTAINING ALCOHOL: NEVER
HOW MANY STANDARD DRINKS CONTAINING ALCOHOL DO YOU HAVE ON A TYPICAL DAY: PATIENT DOES NOT DRINK

## 2025-01-25 ASSESSMENT — PAIN DESCRIPTION - LOCATION: LOCATION: CHEST

## 2025-01-25 ASSESSMENT — HEART SCORE: ECG: NORMAL

## 2025-01-25 ASSESSMENT — PAIN SCALES - GENERAL: PAINLEVEL_OUTOF10: 9

## 2025-01-25 NOTE — ED PROVIDER NOTES
[facility] EMERGENCY DEPARTMENT  EMERGENCY DEPARTMENT ENCOUNTER        Pt Name: Daja Murillo  MRN: [id number]  Birthdate 1970  Date of evaluation: 1/25/2025  Provider: EM Clement  PCP: No primary care provider on file.  Note Started: 4:30 AM EST 1/25/25       I have seen and evaluated this patient with my supervising physician Dr. Burt.      CHIEF COMPLAINT       Chief Complaint   Patient presents with    Chest Pain     Patient complains of chest pain in her mid upper chest. She states that her daughter woke her up around 12am and since then she is not able to go back to sleep due to the chest pain. Describes as a heaviness. Denies cardiac hx.        HISTORY OF PRESENT ILLNESS: 1 or more Elements     History from : Patient    Limitations to history : None    Daja Murillo is a 54 y.o. female who presents accompanied by her . She is complaining of chest pain. She describes it as heaviness and pressure in the middle of her chest. She had gone to bed around 10 PM and felt fine when she woke up around midnight she started having the pain has been constant since then. Denies personal history of DVT, PE or CAD. She does not have diabetes, hypertension or hyperlipidemia. She does not smoke. She tells me that her mom and dad had a cardiac problems in their 80s and 60s respectively. She tells me she was has some swelling in her legs is not worse or more than normal. She had seen Dr. Hall of cardiology for this about six months ago.    Nursing Notes were all reviewed and agreed with or any disagreements were addressed in the HPI.    REVIEW OF SYSTEMS :      Review of Systems   Constitutional:  Negative for fever.   Respiratory:  Negative for shortness of breath.    Cardiovascular:  Positive for chest pain and leg swelling.   Gastrointestinal:  Negative for vomiting.   Skin:  Negative for color change and wound.   Neurological:  Negative for weakness.   Psychiatric/Behavioral:

## 2025-01-26 NOTE — ED PROVIDER NOTES
Attending Supervisory Note/Shared Visit   I have personally performed a face to face diagnostic evaluation on this patient. I have reviewed the mid-level’s findings and agree.  History and Exam by me shows well-appearing female no acute distress/reports waking up with some discomfort in the mid lower chest.  Described as a pressure-like sensation.  Denies difficulties breathing.  States is not worse with exertion.  Denies remitting or exacerbating factors.  Denies cough or fevers.  Patient has had atypical chest pain in the past and has followed with cardiology.  Recent stress test that was unremarkable.     On exam vital signs within normal limits.  Lungs are clear to auscultation.  Patient has a regular rate and rhythm.  No chest wall tenderness noted.  Abdomen is soft and nontender.     Patient resents ED for evaluation of chest discomfort.  Does have a history of chronic lower extremity edema which has been evaluated previously.  States it is no worse than baseline and she has no pain.  Denies recent time spent immobilized.  EKG is nondiagnostic for ACS.  Troponin x 2 within normal limits.  D-dimer was elevated and CTPA was negative for pulmonary embolus.  Patient reports improvement of symptoms is amenable to discharge home with outpatient follow-up.  She has a low heart score and I believe she is safe discharge home.  I agree with the REUBEN plan of care. Please REUBEN Note for full ED course and final disposition.         Disposition:  1. Chest pain, unspecified type          Murray Burt MD  Attending Emergency Physician        Murray Burt MD  01/27/25 0214

## 2025-01-27 ENCOUNTER — CARE COORDINATION (OUTPATIENT)
Dept: OTHER | Facility: CLINIC | Age: 55
End: 2025-01-27

## 2025-01-27 LAB
EKG ATRIAL RATE: 59 BPM
EKG DIAGNOSIS: NORMAL
EKG P AXIS: 45 DEGREES
EKG P-R INTERVAL: 148 MS
EKG Q-T INTERVAL: 434 MS
EKG QRS DURATION: 84 MS
EKG QTC CALCULATION (BAZETT): 429 MS
EKG R AXIS: 40 DEGREES
EKG T AXIS: 51 DEGREES
EKG VENTRICULAR RATE: 59 BPM

## 2025-01-27 PROCEDURE — 93010 ELECTROCARDIOGRAM REPORT: CPT | Performed by: INTERNAL MEDICINE

## 2025-01-27 NOTE — CARE COORDINATION
Ambulatory Care Coordination Note     1/27/2025 12:16 PM     Patient outreach attempt by this ACM today to offer care management services. ACM was unable to reach the patient by telephone today;   left voice message requesting a return phone call to this ACM.     ACM: MATT HUBER RN     Care Summary Note: N/A    PCP/Specialist follow up:       Follow Up:   Plan for next ACM outreach in approximately 1-2 days  to complete:  - outreach attempt to offer care management services.

## 2025-01-28 ENCOUNTER — CARE COORDINATION (OUTPATIENT)
Dept: OTHER | Facility: CLINIC | Age: 55
End: 2025-01-28

## 2025-01-28 NOTE — CARE COORDINATION
Ambulatory Care Coordination Note     1/28/2025 11:12 AM     Patient outreach attempt by this ACM today to offer care management services. ACM was unable to reach the patient by telephone today;   left voice message requesting a return phone call to this ACM.  POW[employer] message sent requesting patient to contact this ACM.     ACM: MATT HUBER RN     Care Summary Note: N/A    PCP/Specialist follow up:       Follow Up:   Plan for next ACM outreach in approximately 1-2 days  to complete:  - outreach attempt to offer care management services.

## 2025-01-30 ENCOUNTER — CARE COORDINATION (OUTPATIENT)
Dept: OTHER | Facility: CLINIC | Age: 55
End: 2025-01-30

## 2025-01-30 NOTE — CARE COORDINATION
Ambulatory Care Coordination Note     1/30/2025 3:18 PM     Patient outreach attempt by this ACM today to offer care management services. ACM was unable to reach the patient by telephone today;   left voice message requesting a return phone call to this ACM.  letter mailed requesting patient  to contact this ACM.      ACM: MATT HUBER RN     Care Summary Note: N/A    PCP/Specialist follow up:       Follow Up:   Plan for next ACM outreach in approximately 2 weeks to complete:  - outreach attempt to offer care management services.

## 2025-02-13 ENCOUNTER — CARE COORDINATION (OUTPATIENT)
Dept: OTHER | Facility: CLINIC | Age: 55
End: 2025-02-13

## 2025-02-13 NOTE — CARE COORDINATION
Ambulatory Care Coordination Note     2025 3:24 PM     Patient Current Location:  [place]     This patient was received as a referral from [facility] .    ACM contacted the patient by telephone. Verified name and  with patient as identifiers. Provided introduction to self, and explanation of the ACM role.   Patient accepted care management services at this time.          ACM: MATT HUBER RN     Challenges to be reviewed by the provider   Additional needs identified to be addressed with provider No  none               Method of communication with provider: none.    Utilization: Initial Call - N/A    Care Summary Note: ACM able to contact patient. Patient agreeable to speak with this ACM. Patient denies any chest pain at this time. Patient would like help scheduling an appointment with a new provider. ACM told patient that she will send appointment information to her through her [employer]. Patient agreeable to f/u calls from this ACM.    Offered patient enrollment in the Remote Patient Monitoring (RPM) program for in-home monitoring: Patient is not eligible for RPM program because: insurance coverage.     Assessments Completed:       2025     3:04 PM   Amb Fall Risk Assessment and TUG Test   Do you feel unsteady or are you worried about falling?  no   2 or more falls in past year? no   Fall with injury in past year? no    ,   Ambulatory Care Coordination Assessment    Care Coordination Protocol  Referral from Primary Care Provider: No  Week 1 - Initial Assessment     Do you have all of your prescriptions and are they filled?: Yes  Barriers to medication adherence: None  Are you able to afford your medications?: Yes  How often do you have trouble taking your medications the way you have been told to take them?: I always take them as prescribed.     Do you have Home O2 Therapy?: No            Do you have any DME?: No              Suggested Interventions and Community Resources         Other

## 2025-02-14 ENCOUNTER — CARE COORDINATION (OUTPATIENT)
Dept: OTHER | Facility: CLINIC | Age: 55
End: 2025-02-14

## 2025-02-14 NOTE — CARE COORDINATION
LIGIA contacted Dr. Steinberg's office to schedule an appointment for patient. Office staff stated that none of the providers in this office are taking new patient. LIGIA will send a [employer] message to patient and assist her in finding a new provider.

## 2025-02-19 ENCOUNTER — HOSPITAL ENCOUNTER (OUTPATIENT)
Dept: SLEEP CENTER | Age: 55
Discharge: HOME | End: 2025-02-19
Payer: COMMERCIAL

## 2025-02-19 DIAGNOSIS — E66.811 OBESITY (BMI 30.0-34.9): ICD-10-CM

## 2025-02-19 DIAGNOSIS — R06.83 SNORING: ICD-10-CM

## 2025-02-19 DIAGNOSIS — F51.01 PRIMARY INSOMNIA: ICD-10-CM

## 2025-02-19 DIAGNOSIS — G47.10 HYPERSOMNIA: ICD-10-CM

## 2025-02-19 PROCEDURE — 95806 SLEEP STUDY UNATT&RESP EFFT: CPT

## 2025-02-20 ENCOUNTER — CARE COORDINATION (OUTPATIENT)
Dept: OTHER | Facility: CLINIC | Age: 55
End: 2025-02-20

## 2025-02-20 NOTE — CARE COORDINATION
Ambulatory Care Coordination Note     2/20/2025 5:31 PM     Patient outreach attempt by this ACM today to perform care management follow up . ACM was unable to reach the patient by telephone today;   left voice message requesting a return phone call to this ACM.     ACM: MATT HBUER RN     Care Summary Note: N/A    PCP/Specialist follow up:   Future Appointments         Provider Specialty Dept Phone    2/26/2025 2:00 PM Mason Gibbs, CASEY - [facility] Primary Care [phone]            Follow Up:   Plan for next ACM outreach in approximately 1 week to complete:  - disease specific assessments.

## 2025-02-23 ENCOUNTER — TELEPHONE (OUTPATIENT)
Dept: PULMONOLOGY | Age: 55
End: 2025-02-23

## 2025-02-23 NOTE — TELEPHONE ENCOUNTER
Please inform patient that her sleep study showed severe sleep apnea and that she stopped breathing or her breathing was inadequate about 33 times for every hour of sleep. Her blood oxygen also dropped as low as 77% during the night.     If she agrees I will order a CPAP via a durable medical equipment company of their choice (if no preference, we will go with [employer], based on location) and they will reach out to keep her updated on the process. This will be the company that is going to work with her insurance and provide the CPAP device, along with replacing the mask and other supplies going forward. If they have any questions, they can let you know or message me via [employer]. If patient agrees with plan, please route the PAP order to DME company (order already completed on a separate order encounter). Patient should be scheduled for 31 to 90 days follow up.    Thanks  Edward Recinos MD

## 2025-02-24 PROBLEM — G47.10 HYPERSOMNIA: Status: ACTIVE | Noted: 2025-02-24

## 2025-02-27 ENCOUNTER — CARE COORDINATION (OUTPATIENT)
Dept: OTHER | Facility: CLINIC | Age: 55
End: 2025-02-27

## 2025-02-27 NOTE — CARE COORDINATION
Ambulatory Care Coordination Note     2/27/2025 3:05 PM     Patient outreach attempt by this ACM today to perform care management follow up . ACM was unable to reach the patient by telephone today;   left voice message requesting a return phone call to this ACM.     ACM: MATT HUBER RN     Care Summary Note: N/A    PCP/Specialist follow up:   Future Appointments         Provider Specialty Dept Phone    3/19/2025 2:30 PM Mason Gibbs APRN - [facility] [phone]    5/13/2025 8:20 AM Edward Recinos MD Sleep Medicine [phone]            Follow Up:   Plan for next AC outreach in approximately 1 week to complete:  - disease specific assessments  See if patient has a new PCP yet .

## 2025-02-27 NOTE — CARE COORDINATION
Ambulatory Care Coordination Note     2025 4:08 PM     Patient Current Location:  [place]     Patient contacted the ACM by telephone. Verified name and  with patient as identifiers.         ACM: RADHA HUBER RN     Challenges to be reviewed by the provider   Additional needs identified to be addressed with provider No  none               Method of communication with provider: none.    Utilization: Patient has not had any utilization since our last call.     Care Summary Note: Patient returned call to this [facility] from message left. Patient agreeable to speak with this AC. Patient denies any chest pain at this time. Patient does not have any new symptoms or changes in condition. Patient has an appointment with a new [facility] provider. ACM will f/u with patient after her visit. Patient agreeable to f/u calls from this [facility].    Offered patient enrollment in the Remote Patient Monitoring (RPM) program for in-home monitoring: Patient is not eligible for RPM program because: insurance coverage.     Assessments Completed:   General Assessment    Do you have any symptoms that are causing concern?: No          Medications Reviewed:   Completed during a previous call     Advance Care Planning:   Not reviewed during this call     Care Planning:   Education Documentation  Educate reporting changes in condition, taught by Radha Huebr RN at 2025  4:09 PM.  Learner: Patient  Readiness: Acceptance  Method: Explanation  Response: Verbalizes Understanding    Educate Patient on When to Call for Symptoms, taught by Radha Huber RN at 2025  4:09 PM.  Learner: Patient  Readiness: Acceptance  Method: Explanation  Response: Verbalizes Understanding    Education Comments  No comments found.     ,    Goals Addressed                   This Visit's Progress     Conditions and Symptoms        I will schedule office visits, as directed by my provider.  I will keep my appointment or reschedule if I have to cancel.  I will

## 2025-03-18 ENCOUNTER — TELEPHONE (OUTPATIENT)
Dept: PHARMACY | Age: 55
End: 2025-03-18

## 2025-03-18 NOTE — TELEPHONE ENCOUNTER
F/U call asking pt if she wanted to r/s her appt w/ [facility] for DM. Pt stated she does not wish to r/s, no longer needs this service. Ok to close referral ?

## 2025-04-04 ENCOUNTER — CARE COORDINATION (OUTPATIENT)
Dept: OTHER | Facility: CLINIC | Age: 55
End: 2025-04-04

## 2025-04-04 NOTE — CARE COORDINATION
Ambulatory Care Coordination Note  ACM: Estella Moraes LPN    ACM sent [employer] (?????) message for [facility] follow up related to covering ACM. See patient message for details and response (as appropriate).     Care Summary Note:ACM covering for ACM RN, Radha, as she is OOO. Per last chart note from 2/27, next ACM out reach is for after new patient PCP appt to ensure it goes well and assess for needs/questions.     ACM to out reach patient next week following her 4/9 appt.    PCP/Specialist follow up:   Future Appointments         Provider Specialty Dept Phone    4/9/2025 9:30 AM Dhara Pablo APRN - [facility] [phone]    5/13/2025 8:20 AM Edward Recinos MD Sleep Medicine [phone]                Estella Moraes LPN- Care Coordinator  Associate Care Management  [address], [place]  [address]  Phone: [phone]  [email or web address]      
normal...

## 2025-04-08 SDOH — HEALTH STABILITY: PHYSICAL HEALTH: ON AVERAGE, HOW MANY DAYS PER WEEK DO YOU ENGAGE IN MODERATE TO STRENUOUS EXERCISE (LIKE A BRISK WALK)?: 2 DAYS

## 2025-04-08 SDOH — HEALTH STABILITY: PHYSICAL HEALTH: ON AVERAGE, HOW MANY MINUTES DO YOU ENGAGE IN EXERCISE AT THIS LEVEL?: 20 MIN

## 2025-04-09 ENCOUNTER — OFFICE VISIT (OUTPATIENT)
Dept: PRIMARY CARE CLINIC | Age: 55
End: 2025-04-09
Payer: COMMERCIAL

## 2025-04-09 VITALS
SYSTOLIC BLOOD PRESSURE: 122 MMHG | DIASTOLIC BLOOD PRESSURE: 70 MMHG | HEIGHT: 71 IN | BODY MASS INDEX: 35.56 KG/M2 | WEIGHT: 254 LBS

## 2025-04-09 DIAGNOSIS — E55.9 VITAMIN D INSUFFICIENCY: ICD-10-CM

## 2025-04-09 DIAGNOSIS — F33.1 MODERATE EPISODE OF RECURRENT MAJOR DEPRESSIVE DISORDER (HCC): Primary | ICD-10-CM

## 2025-04-09 DIAGNOSIS — E66.812 CLASS 2 OBESITY DUE TO EXCESS CALORIES WITHOUT SERIOUS COMORBIDITY WITH BODY MASS INDEX (BMI) OF 35.0 TO 35.9 IN ADULT: ICD-10-CM

## 2025-04-09 DIAGNOSIS — E66.09 CLASS 2 OBESITY DUE TO EXCESS CALORIES WITHOUT SERIOUS COMORBIDITY WITH BODY MASS INDEX (BMI) OF 35.0 TO 35.9 IN ADULT: ICD-10-CM

## 2025-04-09 DIAGNOSIS — Z11.3 ROUTINE SCREENING FOR STI (SEXUALLY TRANSMITTED INFECTION): ICD-10-CM

## 2025-04-09 DIAGNOSIS — Z13.29 SCREENING FOR THYROID DISORDER: ICD-10-CM

## 2025-04-09 DIAGNOSIS — Z13.1 SCREENING FOR DIABETES MELLITUS: ICD-10-CM

## 2025-04-09 DIAGNOSIS — G47.33 OSA (OBSTRUCTIVE SLEEP APNEA): ICD-10-CM

## 2025-04-09 DIAGNOSIS — Z76.89 REFERRAL OF PATIENT WITHOUT EXAMINATION OR TREATMENT: ICD-10-CM

## 2025-04-09 DIAGNOSIS — Z13.220 SCREENING FOR LIPID DISORDERS: ICD-10-CM

## 2025-04-09 DIAGNOSIS — Z12.31 ENCOUNTER FOR SCREENING MAMMOGRAM FOR MALIGNANT NEOPLASM OF BREAST: ICD-10-CM

## 2025-04-09 PROCEDURE — 99214 OFFICE O/P EST MOD 30 MIN: CPT | Performed by: NURSE PRACTITIONER

## 2025-04-09 SDOH — ECONOMIC STABILITY: FOOD INSECURITY: WITHIN THE PAST 12 MONTHS, THE FOOD YOU BOUGHT JUST DIDN'T LAST AND YOU DIDN'T HAVE MONEY TO GET MORE.: NEVER TRUE

## 2025-04-09 SDOH — ECONOMIC STABILITY: FOOD INSECURITY: WITHIN THE PAST 12 MONTHS, YOU WORRIED THAT YOUR FOOD WOULD RUN OUT BEFORE YOU GOT MONEY TO BUY MORE.: NEVER TRUE

## 2025-04-09 ASSESSMENT — PATIENT HEALTH QUESTIONNAIRE - PHQ9
3. TROUBLE FALLING OR STAYING ASLEEP: NOT AT ALL
SUM OF ALL RESPONSES TO PHQ QUESTIONS 1-9: 0
SUM OF ALL RESPONSES TO PHQ QUESTIONS 1-9: 0
4. FEELING TIRED OR HAVING LITTLE ENERGY: NOT AT ALL
10. IF YOU CHECKED OFF ANY PROBLEMS, HOW DIFFICULT HAVE THESE PROBLEMS MADE IT FOR YOU TO DO YOUR WORK, TAKE CARE OF THINGS AT HOME, OR GET ALONG WITH OTHER PEOPLE: NOT DIFFICULT AT ALL
SUM OF ALL RESPONSES TO PHQ QUESTIONS 1-9: 0
2. FEELING DOWN, DEPRESSED OR HOPELESS: NOT AT ALL
7. TROUBLE CONCENTRATING ON THINGS, SUCH AS READING THE NEWSPAPER OR WATCHING TELEVISION: NOT AT ALL
6. FEELING BAD ABOUT YOURSELF - OR THAT YOU ARE A FAILURE OR HAVE LET YOURSELF OR YOUR FAMILY DOWN: NOT AT ALL
8. MOVING OR SPEAKING SO SLOWLY THAT OTHER PEOPLE COULD HAVE NOTICED. OR THE OPPOSITE, BEING SO FIGETY OR RESTLESS THAT YOU HAVE BEEN MOVING AROUND A LOT MORE THAN USUAL: NOT AT ALL
SUM OF ALL RESPONSES TO PHQ QUESTIONS 1-9: 0
5. POOR APPETITE OR OVEREATING: NOT AT ALL
9. THOUGHTS THAT YOU WOULD BE BETTER OFF DEAD, OR OF HURTING YOURSELF: NOT AT ALL
1. LITTLE INTEREST OR PLEASURE IN DOING THINGS: NOT AT ALL

## 2025-04-09 ASSESSMENT — ENCOUNTER SYMPTOMS
VOMITING: 0
WHEEZING: 0
SORE THROAT: 0
DIARRHEA: 0
CHEST TIGHTNESS: 0
SINUS PAIN: 0
COUGH: 0
FACIAL SWELLING: 0
APNEA: 1
EYE PAIN: 0
SHORTNESS OF BREATH: 0
TROUBLE SWALLOWING: 0
NAUSEA: 0
ABDOMINAL PAIN: 0

## 2025-04-09 NOTE — PROGRESS NOTES
2025    Daja Murillo (:  1970) dinh 54 y.o. female, here for evaluation of the following medical concerns:    HPI    54-year-old female comes to clinic for new patient visit .  Patient has past medical history significant for obstructive sleep apnea, depression with anxiety, prediabetes, PCOS hypersomnia, obesity.  Patient reports that she has been doing well overall.  She has been using CPAP regularly which has improved sleep.  She is currently on Wellbutrin and Lexapro prescribed by mental health provider at [facility].  She reports she has been tolerating medications well.  She denies SI/HI or self-harm.    Patient reports she has been concerned about weight.  She reports having gained a significant amount of weight within the last 3 years.  Patient had previously attributed this to stress.  She is not known to have thyroid disorder or other autoimmune issue.  She reports work has been going well.  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  She likes her job and feels well supported.    Patient denies fever, chills, unintentional weight loss, lymphadenopathy, N/V/D, abdominal pain, chest pain, shortness of breath, ALEXANDER, palpitations, LE swelling, headache, vision change, dysuria, hematuria, blood in the stool.    Patient has not had a colonoscopy, but does have a active Cologuard kit at home.  She denies family history of irritable bowel disease or colon cancer.    Patient was previously seeing Dr. Borja for gynecological care.  She has not yet established new GYN provider.  She reports she has not had a period since the age of 51.  She denies history of abnormal Pap she is due for her mammogram.    Patient does not smoke, drink, use other drugs.  She is due for eye exam, dental exam is up-to-date      Review of Systems   Constitutional:  Positive for fatigue and unexpected weight change. Negative for chills and fever.   HENT:  Negative for congestion, ear pain,

## 2025-04-14 ENCOUNTER — CARE COORDINATION (OUTPATIENT)
Dept: OTHER | Facility: CLINIC | Age: 55
End: 2025-04-14

## 2025-04-18 ENCOUNTER — CARE COORDINATION (OUTPATIENT)
Dept: OTHER | Facility: CLINIC | Age: 55
End: 2025-04-18

## 2025-04-18 NOTE — CARE COORDINATION
Ambulatory Care Coordination Note     2025 1:04 PM     Patient Current Location:  [place]     ACM contacted the patient by telephone. Verified name and  with patient as identifiers.         ACM: MATT HUBER RN     Challenges to be reviewed by the provider   Additional needs identified to be addressed with provider No  none               Method of communication with provider: none.    Utilization: Patient has not had any utilization since our last call.     Care Summary Note: ACM able to contact patient. Patient agreeable to speak with this ACM. Patient stated that her appointment with her new PCP went well and patient likes new doctor. Patient stated that new PCP ordered blood work and order her an at home colonoscopy test. Patient denied any questions regarding this screening tool. Patient stated that her son is having a lot of medical issues and is getting a lot of phone calls. Patient stated she can manage her own care and would like to decline [facility] services. Patient is agreeable however to a f/u call from this [facility] to complete charting and graduate. ACM to call patient in around 2 weeks.     Offered patient enrollment in the Remote Patient Monitoring (RPM) program for in-home monitoring: Patient is not eligible for RPM program because: insurance coverage.     Assessments Completed:   General Assessment    Do you have any symptoms that are causing concern?: No          Medications Reviewed:   Completed during a previous call     Advance Care Planning:   Not reviewed during this call     Care Planning:    Goals Addressed                   This Visit's Progress     Conditions and Symptoms        I will schedule office visits, as directed by my provider.  I will keep my appointment or reschedule if I have to cancel.  I will notify my provider of any barriers to my plan of care.  I will notify my provider of any symptoms that indicate a worsening of my condition.    Barriers: none  Plan for overcoming my

## 2025-05-02 ENCOUNTER — CARE COORDINATION (OUTPATIENT)
Dept: OTHER | Facility: CLINIC | Age: 55
End: 2025-05-02

## 2025-05-02 NOTE — CARE COORDINATION
Ambulatory Care Coordination Note     5/2/2025 4:07 PM     Patient outreach attempt by this ACM today to perform care management follow up . ACM was unable to reach the patient by telephone today;   left voice message requesting a return phone call to this ACM.  BRA[employer] message sent requesting patient to contact this ACM.     ACM: MATT HUBER RN     Care Summary Note: ACM attempted to outreach patient to check on current condition. ACM to  see if patient is ready to graduate as discussed during last outreach.    PCP/Specialist follow up:   Future Appointments         Provider Specialty Dept Phone    5/7/2025 2:45 PM Dhara Pablo APRN - [facility] [phone]    5/13/2025 8:20 AM Edward Recinos MD Sleep Medicine [phone]            Follow Up:   Plan for next AC outreach in approximately 2 weeks to complete:  - CC Protocol assessments  - disease specific assessments  - SDOH assessments  - medication review  - education .

## 2025-05-13 ENCOUNTER — OFFICE VISIT (OUTPATIENT)
Age: 55
End: 2025-05-13
Payer: COMMERCIAL

## 2025-05-13 VITALS
SYSTOLIC BLOOD PRESSURE: 132 MMHG | HEIGHT: 71 IN | BODY MASS INDEX: 36.17 KG/M2 | WEIGHT: 258.38 LBS | DIASTOLIC BLOOD PRESSURE: 74 MMHG | HEART RATE: 71 BPM | OXYGEN SATURATION: 96 %

## 2025-05-13 DIAGNOSIS — G47.34 NOCTURNAL HYPOXEMIA: ICD-10-CM

## 2025-05-13 DIAGNOSIS — E66.01 SEVERE OBESITY (HCC): ICD-10-CM

## 2025-05-13 DIAGNOSIS — G47.33 OSA ON CPAP: Primary | ICD-10-CM

## 2025-05-13 PROCEDURE — G2211 COMPLEX E/M VISIT ADD ON: HCPCS | Performed by: STUDENT IN AN ORGANIZED HEALTH CARE EDUCATION/TRAINING PROGRAM

## 2025-05-13 PROCEDURE — 99214 OFFICE O/P EST MOD 30 MIN: CPT | Performed by: STUDENT IN AN ORGANIZED HEALTH CARE EDUCATION/TRAINING PROGRAM

## 2025-05-13 RX ORDER — M-VIT,TX,IRON,MINS/CALC/FOLIC 27MG-0.4MG
1 TABLET ORAL DAILY
COMMUNITY

## 2025-05-13 ASSESSMENT — SLEEP AND FATIGUE QUESTIONNAIRES
HOW LIKELY ARE YOU TO NOD OFF OR FALL ASLEEP WHILE LYING DOWN TO REST IN THE AFTERNOON WHEN CIRCUMSTANCES PERMIT: HIGH CHANCE OF DOZING
HOW LIKELY ARE YOU TO NOD OFF OR FALL ASLEEP WHILE SITTING AND TALKING TO SOMEONE: WOULD NEVER DOZE
ESS TOTAL SCORE: 5
HOW LIKELY ARE YOU TO NOD OFF OR FALL ASLEEP WHILE SITTING AND READING: SLIGHT CHANCE OF DOZING
HOW LIKELY ARE YOU TO NOD OFF OR FALL ASLEEP WHILE SITTING INACTIVE IN A PUBLIC PLACE: WOULD NEVER DOZE
HOW LIKELY ARE YOU TO NOD OFF OR FALL ASLEEP WHILE SITTING QUIETLY AFTER LUNCH WITHOUT ALCOHOL: SLIGHT CHANCE OF DOZING
HOW LIKELY ARE YOU TO NOD OFF OR FALL ASLEEP IN A CAR, WHILE STOPPED FOR A FEW MINUTES IN TRAFFIC: WOULD NEVER DOZE
HOW LIKELY ARE YOU TO NOD OFF OR FALL ASLEEP WHEN YOU ARE A PASSENGER IN A CAR FOR AN HOUR WITHOUT A BREAK: WOULD NEVER DOZE
HOW LIKELY ARE YOU TO NOD OFF OR FALL ASLEEP WHILE WATCHING TV: WOULD NEVER DOZE

## 2025-05-13 NOTE — PROGRESS NOTES
[facility]  Sleep Medicine  [address], [address]  [place] [address]    Chief Complaint   Patient presents with    Hypersomnia         Daja Murillo comes in today for sleep apnea follow-up . She was diagnosed with severe obstructive sleep apnea and is being treated with PAP therapy.   She has been on PAP therapy for a couple of months.  She states she wears the PAP device most nights.     She falls asleep in  within 15 minutes.  She awakens 1 times per night (usually to use the bathroom). The average total amount of sleep is about 8 hours per night and takes naps at times. She does not use sleep aid/s. Symptoms of sleep apnea have improved since using PAP therapy.  She is not snoring with the machine on.  She denies any complaints of too high pressure, hunger for air, dry mouth / nose, low air humidity, high air humidity, temperature issues, and high/frequent leaks. She complains of head pressure when she wakes up in the AM and also sore throat.  DME: [facility]  Mask: AirFit P10 small  Machine: ResMed Airsense 11 Autoset      DATA REVIEWED TODAY:    Diagnostic Review  HST on 2/19/2025 showed respiratory event index of 32.9/h with oxygen desaturation down to a sara of 77%.     [place]           5/13/2025     8:42 AM 12/30/2024     8:53 AM 12/30/2024     6:43 AM   Sleep Medicine   Sitting and reading 1  1   Watching TV 0  1   Sitting, inactive in a public place (e.g. a theatre or a meeting) 0  1   As a passenger in a car for an hour without a break 0  0   Lying down to rest in the afternoon when circumstances permit 3  3   Sitting and talking to someone 0  0   Sitting quietly after a lunch without alcohol 1  2   In a car, while stopped for a few minutes in traffic 0  1   [place] Sleepiness Score 5  9    Neck (Inches)  -- 13.5       Patient-reported       PAP Adherence (dates: 3/5/2025 - 5/8/2025)  Total days used: 62/65  % used days >= 4 hours: 74%  Average hours used

## 2025-05-13 NOTE — PATIENT INSTRUCTIONS
[facility] magnetic mask recall updated contraindications and warning:    Updated Contraindications1  Masks with magnetic components are contraindicated for use by patients where they, or anyone in close physical contact while using the mask, have the following:  Active medical implants that interact with magnets (i.e., pacemakers, implantable cardioverter defibrillators (ICD), neurostimulators, cerebrospinal fluid (CSF) shunts, insulin/infusion pumps)  Metallic implants/objects containing ferromagnetic material (i.e., aneurysm clips/flow disruption devices, embolic coils, stents, valves, electrodes, implants to restore hearing or balance with implanted magnets, ocular implants, metallic splinters in the eye).    Updated Warning2  Keep the mask magnets at a safe distance of at least 6 inches (150 mm) away from implants or medical devices that may be adversely affected by magnetic interference. This warning applies to you or anyone in close physical contact with your mask. The magnets are in the frame and lower headgear clips, with a magnetic field strength of up to 400mT. When worn, they connect to secure the mask but may inadvertently detach while asleep.  Implants/medical devices, including those listed within contraindications, may be adversely affected if they change function under external magnetic fields or contain ferromagnetic materials that attract/repel to magnetic fields (some metallic implants, e.g., contact lenses with metal, dental implants, metallic cranial plates, screws, andressa hole covers, and bone substitute devices). Consult your physician and  of your implant / other medical device for information on the potential adverse effects of magnetic fields.  Note, not all models or variants of medical devices listed in the contraindications are affected by external magnetic fields. If you are not sure whether an implant or medical device falls under the contraindications, or you require

## 2025-05-13 NOTE — PROGRESS NOTES
Ordering Provider:   Edward Recinos MD - Diplomate, [facility] Sleep Medicine  [address] [place] [address], [address]  [place] [address]    Phone [phone]  Fax [phone]    Daja Murillo         : 1970  oxymetry  Diagnosis: [x] Nocturnal hypoxemia (G47.34) [] CSA (G47.31) [x] Apnea (G47.30)   Length of Need: [] 13 Months [] 99 Months [] Other:    Machine (RASHARD!): [] Respironics Dream Station      Auto [] ResMed AirSense     Auto [] Other:     []  CPAP () [] Bilevel ()   Mode: [] Auto [] Spontaneous    Mode: [] Auto [] Spontaneous              Comfort Settings:        Humidifier: [] Heated ()        [] Water chamber replacement ()/ 1 per 6 months        Mask:   [] Nasal () /1 per 3 months [] Full Face () /1 per 3 months   [] Patient choice -Size and fit mask [] Patient Choice - Size and fit mask   [] Dispense:  [] Dispense:    [] Headgear () / 1 per 3 months [] Headgear () / 1 per 3 months   [] Replacement Nasal Cushion ()/2 per month [] Interface Replacement ()/1 per month   [] Replacement Nasal Pillows ()/2 per month         Tubing: [] Heated ()/1 per 3 months    [] Standard ()/1 per 3 months [] Other:           Filters: [] Non-disposable ()/1 per 6 months     [] Ultra-Fine, Disposable ()/2 per month        Miscellaneous: [] Chin Strap ()/ 1 per 6 months [] O2 bleed-in:       LPM   [x] Overnight Oximetry on CPAP/Bilevel  [] Overnight Oximetry on Room air []  Other:    [] Modem: ()         Start Order Date: 25    MEDICAL JUSTIFICATION:  I, the undersigned, certify that the above prescribed supplies are medically necessary for this patient’s wellbeing.  In my opinion, the supplies are both reasonable and necessary in reference to accepted standards of medicalpractice in treatment of this patient’s condition.    Edward Recinos MD    NPI: [id number]       Order Signed Date:

## 2025-05-19 ENCOUNTER — CARE COORDINATION (OUTPATIENT)
Dept: OTHER | Facility: CLINIC | Age: 55
End: 2025-05-19

## 2025-05-19 NOTE — CARE COORDINATION
Ambulatory Care Coordination Note     5/19/2025 4:18 PM     patient outreach attempt by this ACM today to perform care management follow up . AC was unable to reach the patient by telephone today;   left voice message requesting a return phone call to this ACM.  [employer] message sent requesting patient to contact this ACM.     Patient closed (patient disengaged) from the High Risk Care Management program on 5/19/2025.  Patient has the ability to self-manage at this time..  Care management goals have been completed. No further Ambulatory Care Manager follow up scheduled.

## 2025-06-11 DIAGNOSIS — Z13.220 SCREENING FOR LIPID DISORDERS: ICD-10-CM

## 2025-06-11 DIAGNOSIS — F33.1 MODERATE EPISODE OF RECURRENT MAJOR DEPRESSIVE DISORDER (HCC): ICD-10-CM

## 2025-06-11 DIAGNOSIS — Z11.3 ROUTINE SCREENING FOR STI (SEXUALLY TRANSMITTED INFECTION): ICD-10-CM

## 2025-06-11 DIAGNOSIS — Z13.29 SCREENING FOR THYROID DISORDER: ICD-10-CM

## 2025-06-11 DIAGNOSIS — E55.9 VITAMIN D INSUFFICIENCY: ICD-10-CM

## 2025-06-11 DIAGNOSIS — Z13.1 SCREENING FOR DIABETES MELLITUS: ICD-10-CM

## 2025-06-11 DIAGNOSIS — E66.812 CLASS 2 OBESITY DUE TO EXCESS CALORIES WITHOUT SERIOUS COMORBIDITY WITH BODY MASS INDEX (BMI) OF 35.0 TO 35.9 IN ADULT: ICD-10-CM

## 2025-06-11 DIAGNOSIS — E66.09 CLASS 2 OBESITY DUE TO EXCESS CALORIES WITHOUT SERIOUS COMORBIDITY WITH BODY MASS INDEX (BMI) OF 35.0 TO 35.9 IN ADULT: ICD-10-CM

## 2025-06-11 DIAGNOSIS — G47.33 OSA (OBSTRUCTIVE SLEEP APNEA): ICD-10-CM

## 2025-06-11 LAB
25(OH)D3 SERPL-MCNC: 28.9 NG/ML
ALBUMIN SERPL-MCNC: 4.2 G/DL (ref 3.4–5)
ALBUMIN/GLOB SERPL: 1.8 {RATIO} (ref 1.1–2.2)
ALP SERPL-CCNC: 70 U/L (ref 40–129)
ALT SERPL-CCNC: 29 U/L (ref 10–40)
ANION GAP SERPL CALCULATED.3IONS-SCNC: 9 MMOL/L (ref 3–16)
AST SERPL-CCNC: 24 U/L (ref 15–37)
BASOPHILS # BLD: 0 K/UL (ref 0–0.2)
BASOPHILS NFR BLD: 0.6 %
BILIRUB SERPL-MCNC: 0.3 MG/DL (ref 0–1)
BUN SERPL-MCNC: 13 MG/DL (ref 7–20)
CALCIUM SERPL-MCNC: 9.7 MG/DL (ref 8.3–10.6)
CHLORIDE SERPL-SCNC: 105 MMOL/L (ref 99–110)
CHOLEST SERPL-MCNC: 167 MG/DL (ref 0–199)
CO2 SERPL-SCNC: 25 MMOL/L (ref 21–32)
CREAT SERPL-MCNC: 0.9 MG/DL (ref 0.6–1.1)
DEPRECATED RDW RBC AUTO: 14.2 % (ref 12.4–15.4)
EOSINOPHIL # BLD: 0.2 K/UL (ref 0–0.6)
EOSINOPHIL NFR BLD: 4.4 %
EST. AVERAGE GLUCOSE BLD GHB EST-MCNC: 128.4 MG/DL
GFR SERPLBLD CREATININE-BSD FMLA CKD-EPI: 77 ML/MIN/{1.73_M2}
GLUCOSE SERPL-MCNC: 125 MG/DL (ref 70–99)
HBA1C MFR BLD: 6.1 %
HCT VFR BLD AUTO: 41.4 % (ref 36–48)
HCV AB SERPL QL IA: NORMAL
HDLC SERPL-MCNC: 46 MG/DL (ref 40–60)
HGB BLD-MCNC: 14 G/DL (ref 12–16)
LDLC SERPL CALC-MCNC: 99 MG/DL
LYMPHOCYTES # BLD: 1.9 K/UL (ref 1–5.1)
LYMPHOCYTES NFR BLD: 35.8 %
MCH RBC QN AUTO: 29 PG (ref 26–34)
MCHC RBC AUTO-ENTMCNC: 33.8 G/DL (ref 31–36)
MCV RBC AUTO: 85.8 FL (ref 80–100)
MONOCYTES # BLD: 0.5 K/UL (ref 0–1.3)
MONOCYTES NFR BLD: 8.7 %
NEUTROPHILS # BLD: 2.7 K/UL (ref 1.7–7.7)
NEUTROPHILS NFR BLD: 50.5 %
PLATELET # BLD AUTO: 246 K/UL (ref 135–450)
PMV BLD AUTO: 9.2 FL (ref 5–10.5)
POTASSIUM SERPL-SCNC: 4.6 MMOL/L (ref 3.5–5.1)
PROT SERPL-MCNC: 6.6 G/DL (ref 6.4–8.2)
RBC # BLD AUTO: 4.82 M/UL (ref 4–5.2)
SODIUM SERPL-SCNC: 139 MMOL/L (ref 136–145)
TRIGL SERPL-MCNC: 110 MG/DL (ref 0–150)
TSH SERPL DL<=0.005 MIU/L-ACNC: 1.4 UIU/ML (ref 0.27–4.2)
VLDLC SERPL CALC-MCNC: 22 MG/DL
WBC # BLD AUTO: 5.4 K/UL (ref 4–11)

## 2025-06-12 ENCOUNTER — OFFICE VISIT (OUTPATIENT)
Dept: PRIMARY CARE CLINIC | Age: 55
End: 2025-06-12
Payer: COMMERCIAL

## 2025-06-12 VITALS — BODY MASS INDEX: 36.15 KG/M2 | DIASTOLIC BLOOD PRESSURE: 78 MMHG | SYSTOLIC BLOOD PRESSURE: 120 MMHG | WEIGHT: 259.2 LBS

## 2025-06-12 DIAGNOSIS — Z00.00 WELL ADULT EXAM: Primary | ICD-10-CM

## 2025-06-12 DIAGNOSIS — R73.03 PREDIABETES: ICD-10-CM

## 2025-06-12 DIAGNOSIS — G47.33 OSA (OBSTRUCTIVE SLEEP APNEA): ICD-10-CM

## 2025-06-12 LAB
HIV 1+2 AB+HIV1 P24 AG SERPL QL IA: NORMAL
HIV 2 AB SERPL QL IA: NORMAL
HIV1 AB SERPL QL IA: NORMAL
HIV1 P24 AG SERPL QL IA: NORMAL
REAGIN+T PALLIDUM IGG+IGM SERPL-IMP: NORMAL

## 2025-06-12 PROCEDURE — 99396 PREV VISIT EST AGE 40-64: CPT | Performed by: NURSE PRACTITIONER

## 2025-06-12 ASSESSMENT — ENCOUNTER SYMPTOMS
VOMITING: 0
CONSTIPATION: 0
EYE DISCHARGE: 0
SHORTNESS OF BREATH: 0
EYE PAIN: 0
FACIAL SWELLING: 0
BLOOD IN STOOL: 0
SORE THROAT: 0
ABDOMINAL PAIN: 0
EYE REDNESS: 0
COUGH: 0
SINUS PAIN: 0
CHEST TIGHTNESS: 0
BACK PAIN: 0
WHEEZING: 0
NAUSEA: 0
COLOR CHANGE: 0
ABDOMINAL DISTENTION: 0
APNEA: 1
DIARRHEA: 0

## 2025-06-12 NOTE — PROGRESS NOTES
2025    Daja Murillo (:  1970) dinh 54 y.o. female, here for evaluation of the following medical concerns:    HPI    Well Adult Physical   Patient here for a comprehensive physical exam.The patient reports problems -     Sleep Apnea:  Current treatment: cPAP.  Compliance: compliant most of the time.  Residual symptoms include: none.    Prediabetes   Patient's most recent A1c as 6.1% She denies new polyuria,polydipsia, polyphagia. Denies family hx of diabetes.     Do you take any herbs or supplements that were not prescribed by a doctor? yes Are you taking calcium supplements? yes Are you taking aspirin daily? yes    GYN- [place] >>Previous Dr. Borja   Dental [facility]   Eye exam- due  Psychiatrist- [facility] - Depression with anxiety,. Lowered Wellbutrin   Paternal Cousin colon cancer- 50's   Due for colonoscopy   Sees Derm Dr. Castro      Review of Systems   Constitutional:  Negative for chills, fatigue, fever and unexpected weight change.   HENT:  Negative for congestion, ear pain, facial swelling, mouth sores, sinus pain and sore throat.    Eyes:  Negative for pain, discharge and redness.   Respiratory:  Positive for apnea. Negative for cough, chest tightness, shortness of breath and wheezing.    Cardiovascular:  Negative for chest pain and palpitations.   Gastrointestinal:  Negative for abdominal distention, abdominal pain, blood in stool, constipation, diarrhea, nausea and vomiting.   Endocrine: Negative for cold intolerance, heat intolerance and polyuria.   Genitourinary:  Negative for dysuria and hematuria.   Musculoskeletal:  Negative for arthralgias, back pain, myalgias, neck pain and neck stiffness.   Skin:  Negative for color change and rash.   Allergic/Immunologic: Negative for immunocompromised state.   Neurological:  Negative for dizziness, syncope, light-headedness and headaches.   Hematological:  Does not bruise/bleed easily.   Psychiatric/Behavioral:  Positive for